# Patient Record
Sex: MALE | HISPANIC OR LATINO | Employment: STUDENT | URBAN - METROPOLITAN AREA
[De-identification: names, ages, dates, MRNs, and addresses within clinical notes are randomized per-mention and may not be internally consistent; named-entity substitution may affect disease eponyms.]

---
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
## 2022-03-27 ENCOUNTER — HEALTH MAINTENANCE LETTER (OUTPATIENT)
Age: 22
End: 2022-03-27

## 2022-05-19 ENCOUNTER — OFFICE VISIT (OUTPATIENT)
Dept: PEDIATRICS | Facility: CLINIC | Age: 22
End: 2022-05-19
Payer: COMMERCIAL

## 2022-05-19 VITALS
TEMPERATURE: 97.9 F | HEART RATE: 73 BPM | BODY MASS INDEX: 23.23 KG/M2 | SYSTOLIC BLOOD PRESSURE: 115 MMHG | WEIGHT: 131.1 LBS | DIASTOLIC BLOOD PRESSURE: 60 MMHG | HEIGHT: 63 IN

## 2022-05-19 DIAGNOSIS — K59.01 SLOW TRANSIT CONSTIPATION: ICD-10-CM

## 2022-05-19 DIAGNOSIS — K64.4 SKIN TAGS, ANUS OR RECTUM: Primary | ICD-10-CM

## 2022-05-19 PROCEDURE — 90620 MENB-4C VACCINE IM: CPT | Performed by: STUDENT IN AN ORGANIZED HEALTH CARE EDUCATION/TRAINING PROGRAM

## 2022-05-19 PROCEDURE — 90471 IMMUNIZATION ADMIN: CPT | Performed by: STUDENT IN AN ORGANIZED HEALTH CARE EDUCATION/TRAINING PROGRAM

## 2022-05-19 PROCEDURE — 99213 OFFICE O/P EST LOW 20 MIN: CPT | Mod: 25 | Performed by: STUDENT IN AN ORGANIZED HEALTH CARE EDUCATION/TRAINING PROGRAM

## 2022-05-19 RX ORDER — DESVENLAFAXINE 50 MG/1
50 TABLET, FILM COATED, EXTENDED RELEASE ORAL EVERY MORNING
COMMUNITY
End: 2024-06-11

## 2022-05-19 RX ORDER — DESVENLAFAXINE 25 MG/1
25 TABLET, EXTENDED RELEASE ORAL EVERY MORNING
COMMUNITY
End: 2024-06-11

## 2022-05-19 NOTE — PATIENT INSTRUCTIONS
I recommend increasing hydration to 60+ oz of water per day.   Start with 1 capful of miralax mixed into 8 oz of water and drink daily (drink within approx 10 minutes).  Over 1 week will help establish softer more regular stools.

## 2022-05-25 NOTE — PROGRESS NOTES
"  Assessment & Plan     Skin tags, anus or rectum    - Adult General Surg Referral    2 prominent skin tags at anus. No concern for genital warts. Patient is not sexually active, negative sexual history.   Refer to general surgery for management.     Slow transit constipation.   Recommended miralax 1 capful daily with 8 oz of water to help manage slow transit constipation. Increased hydration.                  No follow-ups on file.    Vira HARRISON MD  [facility]   Ike is a 22 year old who presents for the following health issues     HPI     Concerns because of anal issue- ? hemmoroids?  Thinks that he has had this for several years- maybe 3-4  Now they feel bigger- its bothering him, worried about cleaning his anus  Has never had sexual intercourse  Negative history of STI  Denies bleeding from anus at any time- not with BM either  Has had ongoing constipation for years. On antidepressants as well. States stools every 3 days. BMs are uncomfortable/ painful at times.    Psychiatry manages antidepression medication    Patient Active Problem List    Diagnosis Date Noted     Moderate major depression (H) 12/05/2019     Priority: Medium     Controlled substance agreement signed 3/2019 04/12/2019     Priority: Medium     Intermittent asthma, well controlled 05/04/2018     Priority: Medium     Anxiety      Priority: Medium     Created by Conversion  Replacement Utility updated for latest IMO load         ADHD, predominantly inattentive type 10/11/2017     Priority: Medium     Asthma      Priority: Medium     Created by Conversion         Allergic Rhinitis      Priority: Medium     Created by Conversion  Replacement Utility updated for latest IMO load             Review of Systems   Constitutional, HEENT, cardiovascular, pulmonary, gi and gu systems are negative, except as otherwise noted.      Objective    /60   Pulse 73   Temp 97.9  F (36.6  C)   Ht 5' 3.25\" " (1.607 m)   Wt 131 lb 1.6 oz (59.5 kg)   BMI 23.04 kg/m    Body mass index is 23.04 kg/m .  Physical Exam   GENERAL: healthy, alert and no distress  RESP: lungs clear to auscultation - no rales, rhonchi or wheezes  CV: regular rate and rhythm, normal S1 S2, no S3 or S4, no murmur, click or rub, no peripheral edema and peripheral pulses strong  ABDOMEN: soft, nontender, no hepatosplenomegaly, no masses and bowel sounds normal   (male): penis normal and anus with 2 moderate skin tags present.   PSYCH: mentation appears normal, affect normal

## 2022-05-26 ENCOUNTER — TRANSFERRED RECORDS (OUTPATIENT)
Dept: HEALTH INFORMATION MANAGEMENT | Facility: CLINIC | Age: 22
End: 2022-05-26
Payer: COMMERCIAL

## 2022-05-26 ASSESSMENT — PATIENT HEALTH QUESTIONNAIRE - PHQ9
5. POOR APPETITE OR OVEREATING: NOT AT ALL
SUM OF ALL RESPONSES TO PHQ QUESTIONS 1-9: 4

## 2022-05-26 ASSESSMENT — ANXIETY QUESTIONNAIRES
3. WORRYING TOO MUCH ABOUT DIFFERENT THINGS: NOT AT ALL
IF YOU CHECKED OFF ANY PROBLEMS ON THIS QUESTIONNAIRE, HOW DIFFICULT HAVE THESE PROBLEMS MADE IT FOR YOU TO DO YOUR WORK, TAKE CARE OF THINGS AT HOME, OR GET ALONG WITH OTHER PEOPLE: SOMEWHAT DIFFICULT
5. BEING SO RESTLESS THAT IT IS HARD TO SIT STILL: NOT AT ALL
GAD7 TOTAL SCORE: 2
1. FEELING NERVOUS, ANXIOUS, OR ON EDGE: NOT AT ALL
6. BECOMING EASILY ANNOYED OR IRRITABLE: MORE THAN HALF THE DAYS
7. FEELING AFRAID AS IF SOMETHING AWFUL MIGHT HAPPEN: NOT AT ALL
2. NOT BEING ABLE TO STOP OR CONTROL WORRYING: NOT AT ALL
GAD7 TOTAL SCORE: 2

## 2022-05-26 ASSESSMENT — ASTHMA QUESTIONNAIRES: ACT_TOTALSCORE: 25

## 2022-06-02 ENCOUNTER — TELEPHONE (OUTPATIENT)
Dept: SURGERY | Facility: CLINIC | Age: 22
End: 2022-06-02
Payer: COMMERCIAL

## 2022-06-02 NOTE — TELEPHONE ENCOUNTER
ANIKA and sent mychart for gen surg referral. Pt can schedule an appointment with Monet Bond NP, next available opening.   Referral for skin tags from Dr. Ríos.

## 2022-06-20 ENCOUNTER — NURSE TRIAGE (OUTPATIENT)
Dept: NURSING | Facility: CLINIC | Age: 22
End: 2022-06-20
Payer: COMMERCIAL

## 2022-06-20 NOTE — TELEPHONE ENCOUNTER
Saulo calls and says that he wants a surgical consult. RN then checked Epic and saw that pt. Needs an appointment with Monet Barber NP, with a referral from Dr. Ríos. RN was connected with Colon Rectal Surgery and spoke to Sarahy. RN told Aminata daniel pt. Needing the appointment and conferenced pt., with Aminata, for further assistance. COVID 19 Nurse Triage Plan/Patient Instructions    Please be aware that novel coronavirus (COVID-19) may be circulating in the community. If you develop symptoms such as fever, cough, or SOB or if you have concerns about the presence of another infection including coronavirus (COVID-19), please contact your health care provider or visit [email or web address].     Disposition/Instructions    Home care recommended. Follow home care protocol based instructions.    Thank you for taking steps to prevent the spread of this virus.  o Limit your contact with others.  o Wear a simple mask to cover your cough.  o Wash your hands well and often.    Resources    [facility]: About COVID-19: [email or web address]    CDC: What to Do If You're Sick: www.cdc.gov/coronavirus/2019-ncov/about/steps-when-sick.html    CDC: Ending Home Isolation: www.cdc.gov/coronavirus/2019-ncov/hcp/disposition-in-home-patients.html     CDC: Caring for Someone: www.cdc.gov/coronavirus/2019-ncov/if-you-are-sick/care-for-someone.html     [facility]: Interim Guidance for Hospital Discharge to Home: www.health.[facility].[place]/diseases/coronavirus/hcp/hospdischarge.pdf    [facility] clinical trials (COVID-19 research studies): clinicalaffairs.[facility]-clinical-trials     Below are the COVID-19 hotlines at the [facility] of Health ([facility]). Interpreters are available.   o For health questions: Call 304-655-4577 or 1-376.524.7934 (7 a.m. to 7 p.m.)  o For questions about schools and childcare: Call 410-064-2872 or 1-302.647.5464 (7 a.m. to 7 p.m.)                    Reason for Disposition    [1] Follow-up call to recent contact AND [2] information only call, no triage required    Additional Information    Negative: Lab result questions    Negative: [1] Caller is not with the child AND [2] is reporting urgent symptoms    Negative: Medication or pharmacy questions    Negative: Caller is rude or angry    Negative: Caller cannot be reached by phone    Negative: Caller has already spoken to PCP or another triager    Negative: RN needs further essential information from caller in order to complete triage    Negative: [1] Pre-operative urgent question about upcoming surgery or procedure AND [2] triager can't answer question    Negative: [1] Pre-operative non-urgent question about upcoming surgery or procedure AND [2] triager can't answer question    Negative: Requesting regular office appointment    Negative: Requesting referral to a specialist    Negative: [1] Caller requesting nonurgent health information AND [2] PCP's office is the best resource    Negative: Health Information question, no triage required and triager able to answer question    Negative:  Information question, no triage required and triager able to answer question    Negative: Behavior or development information question, no triage required and triager able to answer question    Negative: General information question, no triage required and triager able to answer question    Negative: Question about upcoming scheduled surgery, procedure, or test, no triage required and triager able to answer question    Negative: [1] Caller is not with the child AND [2] probable non-urgent symptoms AND [3] unable to complete triage  (NOTE: parent to call back with triage info)    Protocols used: INFORMATION ONLY CALL - NO TRIAGE-P-

## 2022-06-21 NOTE — TELEPHONE ENCOUNTER
Diagnosis, Referred by & from: Anal Skin Tags   Appt date: 9/20/2022   NOTES STATUS DETAILS   OFFICE NOTE from referring provider Internal [facility]:  5/19/22 - PED OV with Dr. Ríos   OFFICE NOTE from other specialist N/A    DISCHARGE SUMMARY from hospital N/A    DISCHARGE REPORT from the ER Internal [facility]:  4/23/18 - ED OV with Dr. Kelley   OPERATIVE REPORT N/A    MEDICATION LIST Internal    LABS N/A    DIAGNOSTIC PROCEDURES N/A    IMAGING (DISC & REPORT)      XRAY Internal [facility]:  4/23/18 - XR Pelvis

## 2022-09-20 ENCOUNTER — OFFICE VISIT (OUTPATIENT)
Dept: SURGERY | Facility: CLINIC | Age: 22
End: 2022-09-20
Attending: STUDENT IN AN ORGANIZED HEALTH CARE EDUCATION/TRAINING PROGRAM
Payer: COMMERCIAL

## 2022-09-20 ENCOUNTER — PRE VISIT (OUTPATIENT)
Dept: SURGERY | Facility: CLINIC | Age: 22
End: 2022-09-20

## 2022-09-20 VITALS
OXYGEN SATURATION: 97 % | HEIGHT: 63 IN | BODY MASS INDEX: 23.21 KG/M2 | DIASTOLIC BLOOD PRESSURE: 71 MMHG | WEIGHT: 131 LBS | SYSTOLIC BLOOD PRESSURE: 111 MMHG | HEART RATE: 76 BPM

## 2022-09-20 DIAGNOSIS — K64.4 SKIN TAGS, ANUS OR RECTUM: ICD-10-CM

## 2022-09-20 DIAGNOSIS — L29.0 PRURITUS ANI: Primary | ICD-10-CM

## 2022-09-20 PROCEDURE — 99203 OFFICE O/P NEW LOW 30 MIN: CPT | Performed by: NURSE PRACTITIONER

## 2022-09-20 ASSESSMENT — ENCOUNTER SYMPTOMS
NAIL CHANGES: 0
SKIN CHANGES: 0
POOR WOUND HEALING: 0

## 2022-09-20 ASSESSMENT — PAIN SCALES - GENERAL: PAINLEVEL: MODERATE PAIN (4)

## 2022-09-20 NOTE — LETTER
"2022       RE: Saulo Cleary  [address] [address] [address]     Dear Colleague,    Thank you for referring your patient, Saulo Cleary, to the [facility] [place] [facility]. Please see a copy of my visit note below.    Colon and Rectal Surgery Consult Clinic Note    Date: 2022     Referring provider:  Vira HARRISON MD  [address] [place] [address]     RE: Saulo Cleary  : 2000  ZOHRA: 2022    Saulo Cleary is a very pleasant 22 year old male who presents today for anal skin tag.    HPI:  Itching and irritation. No bleeding. Has difficulty keeping clean due to some skin tags that are left behind. Has to go back and wipe. History of constipation but is on Miralax now and stools are better. No anorectal surgeries in the past. Does not smoke. Does not have anal intercourse. No tissue that needs to be manually reduced.    Physical Examination: Exam was chaperoned by Gail Correa RN   /71 (BP Location: Left arm, Patient Position: Sitting, Cuff Size: Adult Regular)   Pulse 76   Ht 5' 3\"   Wt 131 lb   SpO2 97%   BMI 23.21 kg/m    General: alert, oriented, in no acute distress  Perianal external examination:  Perianal skin: Intact with no excoriation or lichenification.  Lesions: No evidence of an external lesion, nodularity, or induration in the perianal region.  Eversion of buttocks: There was not evidence of an anal fissure. Details: N/A.  Skin tags or external hemorrhoids: None.  Digital rectal examination: Was performed.   Sphincter tone: Good.  Palpable lesions: No.  Prostate: Not assessed.  Other: None.    Anoscopy: Was deferred    Assessment/Plan: 22 year old male with anal itching and anal skin tags.  I recommended starting a daily fiber supplement to bulk up his stools to help with the itching and irritation as this is likely due to moisture.  Also use a zinc " barrier cream on the perianal skin.  He is bothered by the anal skin tags and they are interfering with hygiene.  We discussed that these would need to be surgically removed.  Discussed risks of surgery including pain, bleeding, infection, stenosis, hemorrhoids, difficulty voiding after surgery, and the fact that we cannot promise a good cosmetic result and he could get skin tags from the surgery itself.  He states an understanding of these risks and wishes to proceed with surgical intervention.    Medical history:  Past Medical History:   Diagnosis Date     Allergic rhinitis, cause unspecified      Anxiety state, unspecified      Overweight(278.02)      Sialoadenitis      Unspecified asthma(493.90)        Surgical history:  No past surgical history on file.    Problem list:  Patient Active Problem List    Diagnosis Date Noted     Moderate major depression (H) 12/05/2019     Priority: Medium     Controlled substance agreement signed 3/2019 04/12/2019     Priority: Medium     Intermittent asthma, well controlled 05/04/2018     Priority: Medium     Anxiety      Priority: Medium     Created by Conversion  Replacement Utility updated for latest IMO load         ADHD, predominantly inattentive type 10/11/2017     Priority: Medium     Asthma      Priority: Medium     Created by Conversion         Allergic Rhinitis      Priority: Medium     Created by Conversion  Replacement Utility updated for latest IMO load           Medications:  Current Outpatient Medications   Medication Sig Dispense Refill     albuterol (VENTOLIN HFA) 90 mcg/actuation inhaler [ALBUTEROL (VENTOLIN HFA) 90 MCG/ACTUATION INHALER] INHALE TWO PUFFS BY MOUTH EVERY 4 HOURS AS NEEDED FOR WHEEZING OR COUGH 1 Inhaler 4     desvenlafaxine (PRISTIQ) 50 MG 24 hr tablet desvenlafaxine succinate ER 50 mg tablet,extended release 24 hr       desvenlafaxine succinate (PRISTIQ) 25 MG 24 hr tablet desvenlafaxine succinate ER 25 mg tablet,extended release 24 hr        "fluticasone (FLOVENT HFA) 44 mcg/actuation inhaler [FLUTICASONE (FLOVENT HFA) 44 MCG/ACTUATION INHALER] Inhale 2 puffs 2 (two) times a day. 1 Inhaler 4     cloNIDine HCL (CATAPRES) 0.1 MG tablet [CLONIDINE HCL (CATAPRES) 0.1 MG TABLET] Take 0.1 mg by mouth daily.         Allergies:  No Known Allergies    Family history:  No family history on file.    Social history:  Social History     Tobacco Use     Smoking status: Never Smoker     Smokeless tobacco: Never Used   Substance Use Topics     Alcohol use: Not on file    Marital status: single.    Nursing Notes:   Cruz Mera, EMT  9/20/2022  9:03 AM  Signed  Chief Complaint   Patient presents with     Skin Tags       Vitals:    09/20/22 0901   BP: 111/71   BP Location: Left arm   Patient Position: Sitting   Cuff Size: Adult Regular   Pulse: 76   SpO2: 97%   Weight: 131 lb   Height: 5' 3\"       Body mass index is 23.21 kg/m .    Cruz Mera EMT-P         28 minutes spent on the date of encounter (excluding time performing procedures) performing chart review, history and exam, documentation and further activities as noted above.    ROQUE Warren, NP-C  Colon and Rectal Surgery   [facility]    This note was created using speech recognition software and may contain unintended word substitutions.  "

## 2022-09-20 NOTE — PROGRESS NOTES
"Colon and Rectal Surgery Consult Clinic Note    Date: 2022     Referring provider:  Vira HARRISON MD  [address] [place] [address]     RE: Saulo Cleary  : 2000  ZOHRA: 2022    Saulo Cleary is a very pleasant 22 year old male who presents today for anal skin tags.    ***  Physical Examination: Exam was chaperoned by ***  /71 (BP Location: Left arm, Patient Position: Sitting, Cuff Size: Adult Regular)   Pulse 76   Ht 5' 3\"   Wt 131 lb   SpO2 97%   BMI 23.21 kg/m    General: ***  HEENT: ***  Abdomen: ***  Extremities: ***  Perianal external examination:  Perianal skin: {INTACT / ABNORMAL:484038::\"Intact with no excoriation or lichenification\"}.  Lesions: {YES/NO  Comments:688043::\"No evidence of an external lesion, nodularity, or induration in the perianal region\"}.  Eversion of buttocks: There {WAS/WAS NOT (default):546262::\"was not\"} evidence of an anal fissure. Details: {Not Applicable or free text:614745::\"N/A\"}.  Skin tags or external hemorrhoids: {YES/NO  Comments:258681::\"None\"}.  Digital rectal examination: {Was performed/was not performed/etc:888804512}    Anoscopy: {Was performed/was not performed/etc:249573928}    Procedures:  ***    Assessment/Plan: 22 year old male { :483311} with a recent diagnosis of  ***.   ***    Medical history:  Past Medical History:   Diagnosis Date     Allergic rhinitis, cause unspecified      Anxiety state, unspecified      Overweight(278.02)      Sialoadenitis      Unspecified asthma(493.90)        Surgical history:  No past surgical history on file.    Problem list:  Patient Active Problem List    Diagnosis Date Noted     Moderate major depression (H) 2019     Priority: Medium     Controlled substance agreement signed 3/2019 2019     Priority: Medium     Intermittent asthma, well controlled 2018     Priority: Medium     Anxiety      Priority: Medium     Created by Conversion  Replacement Utility updated for latest " "IMO load         ADHD, predominantly inattentive type 10/11/2017     Priority: Medium     Asthma      Priority: Medium     Created by Conversion         Allergic Rhinitis      Priority: Medium     Created by Conversion  Replacement Utility updated for latest IMO load           Medications:  Current Outpatient Medications   Medication Sig Dispense Refill     albuterol (VENTOLIN HFA) 90 mcg/actuation inhaler [ALBUTEROL (VENTOLIN HFA) 90 MCG/ACTUATION INHALER] INHALE TWO PUFFS BY MOUTH EVERY 4 HOURS AS NEEDED FOR WHEEZING OR COUGH 1 Inhaler 4     desvenlafaxine (PRISTIQ) 50 MG 24 hr tablet desvenlafaxine succinate ER 50 mg tablet,extended release 24 hr       desvenlafaxine succinate (PRISTIQ) 25 MG 24 hr tablet desvenlafaxine succinate ER 25 mg tablet,extended release 24 hr       fluticasone (FLOVENT HFA) 44 mcg/actuation inhaler [FLUTICASONE (FLOVENT HFA) 44 MCG/ACTUATION INHALER] Inhale 2 puffs 2 (two) times a day. 1 Inhaler 4     cloNIDine HCL (CATAPRES) 0.1 MG tablet [CLONIDINE HCL (CATAPRES) 0.1 MG TABLET] Take 0.1 mg by mouth daily.         Allergies:  No Known Allergies    Family history:  No family history on file.    Social history:  Social History     Tobacco Use     Smoking status: Never Smoker     Smokeless tobacco: Never Used   Substance Use Topics     Alcohol use: Not on file    Marital status: single.  Occupation: ***.    Nursing Notes:   Cruz Mera, EMT  9/20/2022  9:03 AM  Signed  Chief Complaint   Patient presents with     Skin Tags       Vitals:    09/20/22 0901   BP: 111/71   BP Location: Left arm   Patient Position: Sitting   Cuff Size: Adult Regular   Pulse: 76   SpO2: 97%   Weight: 131 lb   Height: 5' 3\"       Body mass index is 23.21 kg/m .    Cruz Mera EMT-P         *** minutes spent on the date of encounter (excluding time performing procedures) performing chart review, history and exam, documentation and further activities as noted above with an additional *** for ***.     Monet diallo" ROQUE Kendrick, NP-C  Colon and Rectal Surgery   [facility]    This note was created using speech recognition software and may contain unintended word substitutions.

## 2022-09-20 NOTE — PATIENT INSTRUCTIONS
Start a daily fiber supplement such as Citrucel or Metamucil. Start with once a day and slowly increase up to three times a day, if needed, over the next 4-6 weeks  Calmoseptine cream to perianal skin as needed  Will get you scheduled for examination under anesthesia with skin tag removal

## 2022-09-20 NOTE — NURSING NOTE
"Chief Complaint   Patient presents with     Skin Tags       Vitals:    09/20/22 0901   BP: 111/71   BP Location: Left arm   Patient Position: Sitting   Cuff Size: Adult Regular   Pulse: 76   SpO2: 97%   Weight: 131 lb   Height: 5' 3\"       Body mass index is 23.21 kg/m .    Cruz Mera EMT-P    "

## 2022-09-24 ENCOUNTER — HEALTH MAINTENANCE LETTER (OUTPATIENT)
Age: 22
End: 2022-09-24

## 2022-09-30 ENCOUNTER — OFFICE VISIT (OUTPATIENT)
Dept: FAMILY MEDICINE | Facility: CLINIC | Age: 22
End: 2022-09-30
Payer: COMMERCIAL

## 2022-09-30 VITALS
TEMPERATURE: 98.5 F | BODY MASS INDEX: 22.62 KG/M2 | WEIGHT: 127.7 LBS | SYSTOLIC BLOOD PRESSURE: 110 MMHG | DIASTOLIC BLOOD PRESSURE: 72 MMHG

## 2022-09-30 DIAGNOSIS — L08.89 SECONDARY INFECTION OF SKIN: ICD-10-CM

## 2022-09-30 DIAGNOSIS — L98.9 PSORIASIS-LIKE SKIN DISEASE: Primary | ICD-10-CM

## 2022-09-30 PROCEDURE — 99215 OFFICE O/P EST HI 40 MIN: CPT | Performed by: FAMILY MEDICINE

## 2022-09-30 RX ORDER — BETAMETHASONE DIPROPIONATE 0.5 MG/G
OINTMENT, AUGMENTED TOPICAL 2 TIMES DAILY
Qty: 50 G | Refills: 0 | Status: SHIPPED | OUTPATIENT
Start: 2022-09-30 | End: 2024-06-11

## 2022-09-30 RX ORDER — CALCIPOTRIENE 50 UG/G
OINTMENT TOPICAL
Qty: 120 G | Refills: 0 | Status: SHIPPED | OUTPATIENT
Start: 2022-09-30 | End: 2023-09-07

## 2022-09-30 RX ORDER — DOXYCYCLINE 100 MG/1
100 CAPSULE ORAL 2 TIMES DAILY
Qty: 20 CAPSULE | Refills: 0 | Status: SHIPPED | OUTPATIENT
Start: 2022-09-30 | End: 2022-10-10

## 2022-09-30 RX ORDER — MUPIROCIN 20 MG/G
OINTMENT TOPICAL 2 TIMES DAILY
Qty: 22 G | Refills: 0 | Status: SHIPPED | OUTPATIENT
Start: 2022-09-30 | End: 2022-12-16

## 2022-09-30 NOTE — PROGRESS NOTES
ASSESSMENT/PLAN:      ICD-10-CM    1. Psoriasis-like skin disease  L98.9 calcipotriene (DOVONOX) 0.005 % external ointment     augmented betamethasone dipropionate (DIPROLENE-AF) 0.05 % external ointment   2. Secondary infection of skin  L08.89 mupirocin (BACTROBAN) 2 % external ointment     doxycycline monohydrate (MONODOX) 100 MG capsule   On the day of the encounter, time spend on chart review, patient visit, review of testing, extensive review of options for treatment of rash that has failed conservative treatment and documentation was 45  minutes      Patient Instructions   Start doxycycline 2 times a day for 10 days always take with food to prevent nausea vomiting even the pharmacist tells you not to take it with food for infected rash on right buttock    Start Bactroban(mupirocin) ointment twice a day to infected area on right buttock    Once redness crusting has resolved and if there is a flaky silver scaled patch remaining start the calcipotriene to the area 2 times a day followed by the betamethasone 2 times a day can take up to 1 week to 3 weeks to clear and for maintenance use the betamethasone on area the weekends once a day at bedtime    For area on left buttock start the calcipotriene to the area 2 times a day followed by the betamethasone 2 times a day can take up to 1 week to 3 weeks to clear and for maintenance use the betamethasone on area the weekends once a day at bedtime    For area on left knee but a very small amount of the calcific triad and followed by the betamethasone on the area at bedtime    Use only unscented washes soaps and detergent to prevent further irritation to the areas    Please schedule an appointment with your dermatologist for long-term plan-you may be a candidate for oral medications that could resolve these rashes so the rash  do not continue to come back-must be followed by dermatologist          Subjective   Ike is a 22 year old, presenting for the following health  issues:  Derm Problem (Rash around buttocks, has two spots on and off for years,  flare up the last month.  Went to  [facility] and was prescribe Fluocinolone cream that isn't working, complains of itching.  )      HPI     Rash  Onset/Duration: 2 spots on buttocks-intermittent flares since age 14-last flare last month-[facility] fluocinolone cream-2 times a day for 2 weeks, improved, but did not resolve, flaky at first and now worse-red pruritic and crusting-same 2 spots and new spot soft tissue of r medial knee   Hx of eczema nearly resolved once in teen years   Prior to steroid cream [facility], used fungal wash for 3 months and no better   Description  Location: bilateral buttock and medial right knee   Character: raised, flakey, red  Itching: moderate-knee, buttocks-severe   Intensity:  moderate  Progression of Symptoms:  worsening  Accompanying signs and symptoms:   Fever: No  Body aches or joint pain: No  Sore throat symptoms: No  Recent cold symptoms: No  History:           Previous episodes of similar rash: None  New exposures:  None  Recent travel: No  Exposure to similar rash: No  Precipitating or alleviating factors: unknown  What triggers rash   Therapies tried and outcome: moisturizing cream has helped in past, most recent flare made it worse and one flare a month ago,  Current steroid cream worked better, but area did not resolve     Review of Systems   Constitutional, HEENT, cardiovascular, pulmonary, GI, , musculoskeletal, neuro, skin, endocrine and psych systems are negative, except as otherwise noted.      Objective    /72 (BP Location: Left arm, Patient Position: Sitting, Cuff Size: Adult Small)   Temp 98.5  F (36.9  C) (Oral)   Wt 57.9 kg (127 lb 11.2 oz)   BMI 22.62 kg/m    Body mass index is 22.62 kg/m .  Physical Exam   GENERAL:  alert and no distress  MS: no gross musculoskeletal defects noted, no edema  SKIN: buttocks -right lower buttock-large patch of  erythematous excoriated tissue with mild induration scattered crusting over the lower portion of the buttock into the folds of the lower buttock with the upper thigh  Left lower buttock large patch of silvery scale with mild excoriation and flaking minimal erythema slight pink in color  Right upper posterior thigh several small scattered patches of dry silvery scale varying in diameter from 1 cm to 3cm. left upper posterior thigh-2-3 similar lesions as noted on right   CMA in room with this provider during entire exam of buttocks and upper thigh and provider and CMA left the room together just prior to patient getting dressed after exam   NEURO: Normal strength and tone, mentation intact and speech normal, normal gait  PSYCH: mentation appears normal, affect normal      Diagnostic Test Results:  Labs reviewed in Epic  No results found for any visits on 09/30/22.

## 2022-09-30 NOTE — PATIENT INSTRUCTIONS
Start doxycycline 2 times a day for 10 days always take with food to prevent nausea vomiting even the pharmacist tells you not to take it with food for infected rash on right buttock    Start Bactroban(mupirocin) ointment twice a day to infected area on right buttock    Once redness crusting has resolved and if there is a flaky silver scaled patch remaining start the calcipotriene to the area 2 times a day followed by the betamethasone 2 times a day can take up to 1 week to 3 weeks to clear and for maintenance use the betamethasone on area the weekends once a day at bedtime    For area on left buttock start the calcipotriene to the area 2 times a day followed by the betamethasone 2 times a day can take up to 1 week to 3 weeks to clear and for maintenance use the betamethasone on area the weekends once a day at bedtime    For area on left knee but a very small amount of the calcific triad and followed by the betamethasone on the area at bedtime    Use only unscented washes soaps and detergent to prevent further irritation to the areas    Please schedule an appointment with your dermatologist for long-term plan-you may be a candidate for oral medications that could resolve these rashes so the rash  do not continue to come back-must be followed by dermatologist

## 2022-10-10 ENCOUNTER — TRANSFERRED RECORDS (OUTPATIENT)
Dept: HEALTH INFORMATION MANAGEMENT | Facility: CLINIC | Age: 22
End: 2022-10-10

## 2022-11-07 DIAGNOSIS — K64.4 SKIN TAGS, ANUS OR RECTUM: Primary | ICD-10-CM

## 2022-11-14 ENCOUNTER — TELEPHONE (OUTPATIENT)
Dept: SURGERY | Facility: CLINIC | Age: 22
End: 2022-11-14

## 2022-11-14 NOTE — TELEPHONE ENCOUNTER
Per Case Request, patient's outpatient surgery can be with any C&R surgeon.    Spoke with patient, completed the following scheduling, will send Surgery Packet via [employer]:    Surgery: Exam Under Anesthesia, Anal Skin Tag Removal    Patient is scheduled for surgery with Dr. Jose Roberto Escobar    Spoke with: Ike's mother Amelia (his [employer] messages also go to her)    Date of Surgery: 12/30/2022    [facility]: [facility]    Informed patient they will need an adult  Yes    Pre op with Provider Monet Bond NP    H&P: Scheduled with PAC on 12/16/2022    Post-op appt scheduled with Rachel Warren PA-C, on 1/16/2023 at 4:00 PM    Surgery packet: will send via [employer]NOW     Additional comments: patient is Fully Vaccinated: [employer], will bring Vaccination Record Card.    Stacey Jackson  Daniella-op Coordinator  [place]-Rectal Surgery  Direct Phone: [phone]

## 2022-11-15 ENCOUNTER — TRANSFERRED RECORDS (OUTPATIENT)
Dept: HEALTH INFORMATION MANAGEMENT | Facility: CLINIC | Age: 22
End: 2022-11-15

## 2022-11-15 NOTE — TELEPHONE ENCOUNTER
FUTURE VISIT INFORMATION      SURGERY INFORMATION:    Date: 12/30/22    [facility]    Surgeon:  Jose Roberto Escobar MD    Anesthesia Type:  MAC    Procedure: EXAM UNDER ANESTHESIA, ANUS, anal skin tag removal REMOVAL, SKIN TAG, ANUS    RECORDS REQUESTED FROM:       Primary Care Provider: Vira Ríos MD

## 2022-12-16 ENCOUNTER — OFFICE VISIT (OUTPATIENT)
Dept: SURGERY | Facility: CLINIC | Age: 22
End: 2022-12-16
Payer: COMMERCIAL

## 2022-12-16 ENCOUNTER — ANESTHESIA EVENT (OUTPATIENT)
Dept: SURGERY | Facility: CLINIC | Age: 22
End: 2022-12-16

## 2022-12-16 ENCOUNTER — PRE VISIT (OUTPATIENT)
Dept: SURGERY | Facility: CLINIC | Age: 22
End: 2022-12-16

## 2022-12-16 VITALS
BODY MASS INDEX: 23.74 KG/M2 | OXYGEN SATURATION: 93 % | TEMPERATURE: 97.9 F | SYSTOLIC BLOOD PRESSURE: 115 MMHG | HEIGHT: 63 IN | HEART RATE: 74 BPM | RESPIRATION RATE: 16 BRPM | DIASTOLIC BLOOD PRESSURE: 73 MMHG | WEIGHT: 134 LBS

## 2022-12-16 DIAGNOSIS — Z01.818 PREOP EXAMINATION: Primary | ICD-10-CM

## 2022-12-16 DIAGNOSIS — K64.4 SKIN TAGS, ANUS OR RECTUM: ICD-10-CM

## 2022-12-16 PROCEDURE — 99203 OFFICE O/P NEW LOW 30 MIN: CPT | Performed by: NURSE PRACTITIONER

## 2022-12-16 ASSESSMENT — PAIN SCALES - GENERAL: PAINLEVEL: NO PAIN (0)

## 2022-12-16 ASSESSMENT — ENCOUNTER SYMPTOMS: ORTHOPNEA: 0

## 2022-12-16 NOTE — H&P
Pre-Operative H & P     CC:  Preoperative exam to assess for increased cardiopulmonary risk while undergoing surgery and anesthesia.    Date of Encounter: 12/16/2022  Primary Care Physician:  Vira Ríos     Reason for visit:   Encounter Diagnoses   Name Primary?     Preop examination Yes     Skin tags, anus or rectum        HPI  Saulo Cleary is a 22 year old male who presents for pre-operative H & P in preparation for  Procedure Information     Date/Time: 12/30/22, time unknown     Procedure:   EXAM UNDER ANESTHESIA, ANUS, anal skin tag removal N/A MAC   REMOVAL, SKIN TAG, ANUS           Anesthesia type: MAC    Pre-op diagnosis: anal skin tags    Location: [facility] [place]    Providers: Dr. Escobar          Saulo Cleary is a 22 year old male with asthma, anxiety, depression, ADHD and insomnia that has anal skin tags.  He feels these are interfering with hygiene. They also cause itching and irritation.  He has consulted with with ROQUE Kraus in the colorectal clinic and the above listed procedure has been recommended for treatment.     History is obtained from the patient and chart review    Hx of abnormal bleeding or anti-platelet use: none      Past Medical History  Past Medical History:   Diagnosis Date     ADHD (attention deficit hyperactivity disorder)      Allergic rhinitis, cause unspecified      Anxiety state, unspecified      Depression      Insomnia      Overweight(278.02)      Sialoadenitis      Unspecified asthma(493.90)        Past Surgical History  Past Surgical History:   Procedure Laterality Date     wisdom teeth         Prior to Admission Medications  Current Outpatient Medications   Medication Sig Dispense Refill     albuterol (VENTOLIN HFA) 90 mcg/actuation inhaler [ALBUTEROL (VENTOLIN HFA) 90 MCG/ACTUATION INHALER] INHALE TWO PUFFS BY MOUTH EVERY 4 HOURS AS NEEDED FOR WHEEZING OR COUGH 1 Inhaler 4     augmented betamethasone dipropionate (DIPROLENE-AF)  0.05 % external ointment Apply topically 2 times daily 50 g 0     calcipotriene (DOVONOX) 0.005 % external ointment Apply to area of rash 2 time a day 120 g 0     cloNIDine HCL (CATAPRES) 0.1 MG tablet Take 0.1 mg by mouth as needed       desvenlafaxine (PRISTIQ) 50 MG 24 hr tablet Take 50 mg by mouth every morning Take with 25 mg tablet for total dose of 75 mg       desvenlafaxine succinate (PRISTIQ) 25 MG 24 hr tablet Take 25 mg by mouth every morning Take with 50 mg tablet for total dose of 75 mg       fluticasone (FLOVENT HFA) 44 mcg/actuation inhaler [FLUTICASONE (FLOVENT HFA) 44 MCG/ACTUATION INHALER] Inhale 2 puffs 2 (two) times a day. (Patient taking differently: Inhale 2 puffs into the lungs as needed) 1 Inhaler 4       Allergies  No Known Allergies    Social History  Social History     Socioeconomic History     Marital status: Single     Spouse name: Not on file     Number of children: 0     Years of education: Not on file     Highest education level: Not on file   Occupational History     Occupation: student   Tobacco Use     Smoking status: Never     Smokeless tobacco: Never   Substance and Sexual Activity     Alcohol use: Never     Drug use: Never     Sexual activity: Not on file   Other Topics Concern     Not on file   Social History Narrative    Parents are .  Ike and sister Monet stay at both parents' homes.     Social Determinants of Health     Financial Resource Strain: Not on file   Food Insecurity: Not on file   Transportation Needs: Not on file   Physical Activity: Not on file   Stress: Not on file   Social Connections: Not on file   Intimate Partner Violence: Not on file   Housing Stability: Not on file       Family History  Family History   Adopted: Yes   Problem Relation Age of Onset     Unknown/Adopted Mother      Unknown/Adopted Father      Anesthesia Reaction No family hx of      Thrombosis No family hx of        Review of Systems  The complete review of systems is negative  "other than noted in the HPI or here.   Anesthesia Evaluation   Pt has had prior anesthetic.     No history of anesthetic complications       ROS/MED HX  ENT/Pulmonary:     (+) Intermittent, asthma Last exacerbation: 9 years ago ,  Treatment: Inhaler prn,   (-) MAYO risk factors and recent URI   Neurologic:  - neg neurologic ROS     Cardiovascular:  - neg cardiovascular ROS   (+) -----No previous cardiac testing  (-) MACK and orthopnea/PND   METS/Exercise Tolerance: >4 METS Comment: Does weight lifting for exercise.  Is able to walk several blocks.     Hematologic:  - neg hematologic  ROS  (-) history of blood clots and history of blood transfusion   Musculoskeletal:  - neg musculoskeletal ROS     GI/Hepatic: Comment: Anal skin tags      Renal/Genitourinary:  - neg Renal ROS     Endo:  - neg endo ROS     Psychiatric/Substance Use:     (+) psychiatric history anxiety, depression and other (comment) (ADHD, insomnia)     Infectious Disease:  - neg infectious disease ROS  (-) Recent Fever   Malignancy:  - neg malignancy ROS     Other:  - neg other ROS          /73 (BP Location: Right arm, Patient Position: Sitting, Cuff Size: Adult Regular)   Pulse 74   Temp 97.9  F (36.6  C) (Oral)   Resp 16   Ht 1.6 m (5' 3\")   Wt 60.8 kg (134 lb)   SpO2 93%   BMI 23.74 kg/m      Physical Exam   Constitutional: Awake, alert, cooperative, no apparent distress, and appears stated age.  Eyes: Pupils equal, round and reactive to light, extra ocular muscles intact, sclera clear, conjunctiva normal.  HENT: Normocephalic, oral pharynx with moist mucus membranes, good dentition. No goiter appreciated.   Respiratory: Clear to auscultation bilaterally, no crackles or wheezing.  Cardiovascular: Regular rate and rhythm, normal S1 and S2, and no murmur noted.  Carotids +2, no bruits. No edema. Palpable pulses to radial  DP and PT arteries.   GI: Normal bowel sounds, soft, non-distended, non-tender, no masses palpated, no " hepatosplenomegaly.   Lymph/Hematologic: No cervical lymphadenopathy and no supraclavicular lymphadenopathy.  Genitourinary:  deferred  Skin: Warm and dry.    Musculoskeletal: Full ROM of neck. There is no redness, warmth, or swelling of the exposed joints. Gross motor strength is normal.    Neurologic: Awake, alert, oriented to name, place and time. Cranial nerves II-XII are grossly intact. Gait is normal.   Neuropsychiatric: Calm, cooperative. Normal affect.     Prior Labs/Diagnostic Studies   All labs and imaging personally reviewed     EKG/ stress test - none    Component      Latest Ref Rng & Units 10/1/2020   WBC      4.0 - 11.0 thou/uL 4.8   RBC Count      4.40 - 6.20 mill/uL 4.76   Hemoglobin      14.0 - 18.0 g/dL 15.7   Hematocrit      40.0 - 54.0 % 46.3   MCV      80 - 100 fL 97   MCH      27.0 - 34.0 pg 33.1   MCHC      32.0 - 36.0 g/dL 34.0   RDW      11.0 - 14.5 % 10.1 (L)   Platelet Count      140 - 440 thou/uL 255   Mean Platelet Volume      7.0 - 10.0 fL 8.3   % Neutrophils      50 - 70 % 53   % Lymphocytes      20 - 40 % 34   % Monocytes      2 - 10 % 7   % Eosinophils      0 - 6 % 5   % Basophils      0 - 2 % 1   Absolute Neutrophils      2.0 - 7.7 thou/uL 2.5   Absolute Lymphocytes      0.8 - 4.4 thou/uL 1.6   Absolute Monocytes      0.0 - 0.9 thou/uL 0.3   Eosinophils Absolute      0.0 - 0.4 thou/uL 0.2   Absolute Basophils      0.0 - 0.2 thou/uL 0.0   Sodium      136 - 145 mmol/L 140   Potassium      3.5 - 5.0 mmol/L 4.5   Chloride      98 - 107 mmol/L 104   Carbon Dioxide      22 - 31 mmol/L 25   Anion Gap      5 - 18 mmol/L 11   Glucose      70 - 125 mg/dL 72   Urea Nitrogen      8 - 22 mg/dL 10   Creatinine      0.70 - 1.30 mg/dL 0.78   GFR Estimate If Black      >60 mL/min/1.73m2 >60   GFR Estimate      >60 mL/min/1.73m2 >60   Bilirubin Total      0.0 - 1.0 mg/dL 0.3   Calcium      8.5 - 10.5 mg/dL 9.6   Protein Total      6.0 - 8.0 g/dL 7.4   Albumin      3.5 - 5.0 g/dL 4.2   Alkaline  "Phosphatase      50 - 364 U/L 91   AST      0 - 40 U/L 17   ALT      0 - 45 U/L <9       The patient's records and results personally reviewed by this provider.     Outside records reviewed from: Care Everywhere        Assessment      Saulo Cleary is a 22 year old male seen as a PAC referral for risk assessment and optimization for anesthesia.    Plan/Recommendations  Pt will be optimized for the proposed procedure.  See below for details on the assessment, risk, and preoperative recommendations    NEUROLOGY  - No history of TIA, CVA or seizure    -Post Op delirium risk factors:  No risk identified    ENT  - No current airway concerns.  Will need to be reassessed day of surgery.  Mallampati: I  TM: > 3    CARDIAC  - no noted cardiac history  - METS (Metabolic Equivalents) = >4  Patient performs 4 or more METS exercise without symptoms            Total Score: 0      RCRI-Very low risk: Class 1 0.4% complication rate            Total Score: 0        PULMONARY    MAYO Low Risk            Total Score: 1    MAYO: Male      - Asthma  Well controlled  - Tobacco History      History   Smoking Status     Never   Smokeless Tobacco     Never       GI  - denies GERD  PONV Medium Risk  Total Score: 2           1 AN PONV: Patient is not a current smoker    1 AN PONV: Intended Post Op Opioids            ENDOCRINE    - BMI: Estimated body mass index is 23.74 kg/m  as calculated from the following:    Height as of this encounter: 1.6 m (5' 3\").    Weight as of this encounter: 60.8 kg (134 lb).  Healthy Weight (BMI 18.5-24.9)  - No history of Diabetes Mellitus    HEME  VTE Low Risk 0.5%            Total Score: 2    VTE: Male      - No history of abnormal bleeding or antiplatelet use.          PSYCH  - continue pristiq with no interruption prior to surgery         Different anesthesia methods/types have been discussed with the patient, but they are aware that the final plan will be decided by the assigned anesthesia provider on the " date of service.      The patient is optimized for their procedure. AVS with information on surgery time/arrival time, meds and NPO status given by nursing staff. No further diagnostic testing indicated.      On the day of service:     Prep time: 6 minutes  Visit time: 15 minutes  Documentation time: 11 minutes  ------------------------------------------  Total time: 32 minutes      ROQUE Howard CNP  Preoperative Assessment Center  [facility]  Clinic and Surgery Center  Phone: [phone]  Fax: [phone]

## 2022-12-23 NOTE — TELEPHONE ENCOUNTER
Per Case Request, patient's outpatient surgery can be with any C&R surgeon.     Spoke with patient's mother Amelia, explained that Dr. Escobar got called away to do urgent surgeries at the hospital on 12/30/2022, so unfortunately we will have to reschedule patient's surgery. I explained that patient's surgery can be with any of the C&R surgeons, so the next available would be 1/3/3023 with Dr. Jevon Velarde at the [facility] OR. Amelia said that date would work well, we completed the following scheduling, will send Surgery Packet via [employer]:     Surgery: Exam Under Anesthesia, Anal Skin Tag Removal     Patient is scheduled for surgery with Dr. Jevon Velarde    Spoke with: Ike's mother Amelia (his [employer] messages also go to her)     Date of Surgery: 1/3/2023     Location: [facility] OR     Informed patient they will need an adult  Yes     Pre op with Provider Monet Bond NP     H&P: Completed with PAC on 12/16/2022     Post-op appt scheduled with Rachel Warren PA-C, on 1/16/2023 at 4:00 PM     Surgery packet: will send via [employer]      Additional comments: patient is Fully Vaccinated: [employer], will bring Vaccination Record Card (this info was for the [facility], it is not needed for the [facility] OR)     Stacey Jackson  Daniella-op Coordinator  [place]-Rectal Surgery  Direct Phone: [phone]

## 2022-12-29 NOTE — DISCHARGE INSTRUCTIONS
Anorectal Surgery Instructions    What can I expect after anorectal surgery?  Most anorectal procedures are done as outpatient surgery, and you go home the same day as the procedure. A few surgical procedures will require that you stay in the hospital for about one to three days. No matter where the procedure is done or how long or short it takes, these recommendations will help you heal and feel more comfortable.    Medicines:  The anal area is very sensitive; you can expect to have some pain for up to 2-4 weeks after the procedure. Your doctor will give you a prescription for one or more pain medications.    Take ibuprofen 600 mg four times a day OR naprosyn 500 mg twice a day  Take this on a regular basis (not as needed) following your surgery.   The drugs are best taken with food.  Do not take if it causes stomach upset or if you have a history of ulcers or gastritis. You can stop the naprosyn (or ibuprofen) or reduce the dose when you are feeling better.    Take acetominaphen (Tylenol) 650-1000 mg four times a day.   Take this on a regular basis (not as needed) following surgery for pain control.   Take the lower dose if you are >65 years old or have liver disease. The maximum dose of acetominaphen is 4000 mg a day. You can stop the acetaminophen or reduce the dose when you are feeling better.  It is important to realize that many narcotic pain relievers (including vicodin, percocet, tylenol #3) also have acetaminophen, and excessive doses of acetaminophen can be dangerous, so do not take these in addition to acetominaphen.  You may take narcotics that don't contain acetominaphen such as oxycodone.      Take oxycodone AS NEEDED in addition to the acetominaphen and naprosyn.    Because narcotics have side effects (including constipation), you should reduce your use of these medications as tolerated as your pain improves.    *In general, the best strategy is to take (if you are able to tolerate it) the tylenol  and ibuprofen on a regular basis until your pain has largely gone away. You can take the narcotic pain medicine as needed in addition to the tylenol and ibuprofen. As your pain begins to lessen, you should cut back on your narcotic use while continuing to take your regular tylenol and ibuprofen doses.    Refilling prescriptions. If you need additional pain medication, please call the triage nurse at [phone] during normal business hours (8 a.m. to 4 p.m., Monday though Friday) or have your pharmacy fax a refill request to [phone]. If you call after hours or on the weekends, the doctor on call may not know you personally and may not renew narcotic pain medication by phone. Call your primary care provider for all other medication refills.    Perineal care:  Tub baths:  If possible, take a tub bath or shower immediately after each bowel movement.   Baths should be take at least 3 times daily for the first week to 10 days following your procedure. You should soak in the tub for 10 to 15 minutes each time with water as warm as you can tolerate. You may also use a microphone shower head (with an extension) to shower your back-side instead of a bath.  Even after you go back to work, it is a good idea to sit in the tub in the morning, after returning from work, and again in the evening before bedtime.    Bleeding/Infection:  You can expect to have some bleeding after bowel movements, but it should stop soon after you wipe.   Use a wet cloth or perianal pad (Tucks or Preparation H pads) to gently wipe the area after each bowel movement.  Do not rub the anal area or use a lot of pressure.  Using a spray bottle filled with warm water helps loosen any remaining stool. Blot gently with a soft dry cloth or tissue paper.  Infection around the anal opening is not very common. The anal area has excellent blood supply, which helps the area to heal. Bloody discharge after bowel movements is normal and may last 2 to 4 weeks  after your surgery. However, if you bleed between bowel movements and cannot get it to stop, call the triage nurse immediately [phone].    Bowel function:  Take a fiber supplement such as Metamucil, which is over the counter. It is important to drink six to eight glasses of water or juice everyday when using fiber products.    If you do not have a bowel movement after 1-2 days:  Take Milk of Magnesia-2 tablespoons.     If there are no results, repeat this or add over the counter Miralax.    If you still do not have results, contact the clinic.   If there are no results, repeat this. Stop taking Milk of Magnesia or other laxatives if you begin to have diarrhea.    * Constipation will cause you to strain when you have a bowel movement. The hard stool will be difficult to pass, will increase pain and bleeding, and will slow down healing. Try to avoid constipation and/or diarrhea as this can make the pain and bleeding worse.    * It is important to have regular bowel movements at least every other day and to keep your stool soft. A high fiber diet, including at least four servings of fruits or vegetables daily, will help to keep your bowel movements regular and soft.    Activity:  After your procedure, there are no restrictions on your activity   Except restrictions because being on narcotics and in pain, such as no heavy machine operating or driving.   You may walk, climb stairs, ride in a car, and sit as tolerated.   It is helpful to avoid sitting in one position for long periods (2 or more hours).  After some surgeries, you may be told not to perform any lifting (more than 10 pounds) for several weeks after surgery.    When to call:  When do I need to call the doctor or triage nurse?  If you experience any of the problems listed here, call our triage nurse during business hours ([phone]).   The nurse will help you with your problem or have the doctor call you.   After hours and on weekends, please call the  [facility] number ([phone]) and ask for the colon and rectal surgery person on call.   Call for:   ? Fever greater than 101 degrees   ? Chills   ? Foul-smelling drainage   ? Nausea and vomiting   ? Diarrhea - greater than 4 water stools in 24 hours   ? Constipation - no bowel movement after 3 days   ? Severe bleeding that does not stop soon after a bowel movement   ? Problems with the incision, including severe pain, swelling, or redness  [facility]  Home Care Following Anesthesia  For 24 hours after surgery:  Get plenty of rest.  A responsible adult must stay with you for at least 24 hours after you leave the surgery center.  Do not drive or use heavy equipment.  If you have weakness or tingling, don't drive or use heavy equipment until this feeling goes away.   Do not drink alcohol.   Avoid strenuous or risky activities.  Ask for help when climbing stairs.  You may feel lightheaded.  IF so, sit for a few minutes before standing.  Have someone help you get up.   If you have nausea (feel sick to your stomach): Drink only clear liquids such as apple juice, ginger ale, broth or 7-Up.  Rest may also help.  Be sure to drink enough fluids.  Move to a regular diet as you feel able.   You may have a slight fever.  Call the doctor if your fever is over 100 F (37.7 C) (taken under the tongue) or lasts longer than 24 hours.  You may have a dry mouth, a sore throat, muscle aches or trouble sleeping. These should go away after 24 hours.  Do not make important or legal decisions.   It is recommended to avoid smoking.               Tips for taking pain medications  To get the best pain relief possible, remember these points:  Take pain medications as directed, before pain becomes severe.  Pain medication can upset your stomach: taking it with food may help.  Constipation is a common side effect of pain medication. Drink plenty of  fluids.  Eat foods high in fiber. Take a stool  softener if recommended by your doctor or pharmacist.  Do not drink alcohol, drive or operate machinery while taking pain medications.  Ask about other ways to control pain, such as with heat, ice or relaxation.    Tylenol/Acetaminophen Consumption  To help encourage the safe use of acetaminophen, the makers of TYLENOL  have lowered the maximum daily dose for single-ingredient Extra Strength TYLENOL  (acetaminophen) products sold in the [place] from 8 pills per day (4,000 mg) to 6 pills per day (3,000 mg). The dosing interval has also changed from 2 pills every 4-6 hours to 2 pills every 6 hours.  If you feel your pain relief is insufficient, you may take Tylenol/Acetaminophen in addition to your narcotic pain medication.   Be careful not to exceed 3,000 mg of Tylenol/Acetaminophen in a 24 hour period from all sources.  If you are taking extra strength Tylenol/acetaminophen (500 mg), the maximum dose is 6 tablets in 24 hours.  If you are taking regular strength acetaminophen (325 mg), the maximum dose is 9 tablets in 24 hours.  Tylenol 975mg given at 11:39AM.  Next dose given at 5:39PM, then follow package directions.    Call a doctor for any of the following:  Signs of infection (fever, growing tenderness at the surgery site, a large amount of drainage or bleeding, severe pain, foul-smelling drainage, redness, swelling).  It has been over 8 to 10 hours since surgery and you are still not able to urinate (pass water).  Headache for over 24 hours.  Signs of Covid-19 infection (temperature over 100 degrees, shortness of breath, cough, loss of taste/smell, generalized body aches, persistent headache, chills, sore throat, nausea/vomiting/diarrhea)  Your doctor is:  Dr. Jevon Velarde, Colon Rectal: [phone]                  Or dial [phone] and ask for the resident on call for:  Colon Rectal  For emergency care, call the:  [facility]:  [phone] (TTY for hearing impaired:  [phone])

## 2023-01-02 ENCOUNTER — ANESTHESIA EVENT (OUTPATIENT)
Dept: SURGERY | Facility: AMBULATORY SURGERY CENTER | Age: 23
End: 2023-01-02
Payer: COMMERCIAL

## 2023-01-02 RX ORDER — ONDANSETRON 4 MG/1
4 TABLET, ORALLY DISINTEGRATING ORAL EVERY 30 MIN PRN
Status: CANCELLED | OUTPATIENT
Start: 2023-01-02

## 2023-01-02 RX ORDER — SODIUM CHLORIDE, SODIUM LACTATE, POTASSIUM CHLORIDE, CALCIUM CHLORIDE 600; 310; 30; 20 MG/100ML; MG/100ML; MG/100ML; MG/100ML
INJECTION, SOLUTION INTRAVENOUS CONTINUOUS
Status: CANCELLED | OUTPATIENT
Start: 2023-01-02

## 2023-01-02 RX ORDER — FENTANYL CITRATE 50 UG/ML
25 INJECTION, SOLUTION INTRAMUSCULAR; INTRAVENOUS EVERY 5 MIN PRN
Status: CANCELLED | OUTPATIENT
Start: 2023-01-02

## 2023-01-02 RX ORDER — MEPERIDINE HYDROCHLORIDE 25 MG/ML
12.5 INJECTION INTRAMUSCULAR; INTRAVENOUS; SUBCUTANEOUS
Status: CANCELLED | OUTPATIENT
Start: 2023-01-02

## 2023-01-02 RX ORDER — FENTANYL CITRATE 50 UG/ML
25 INJECTION, SOLUTION INTRAMUSCULAR; INTRAVENOUS
Status: CANCELLED | OUTPATIENT
Start: 2023-01-02

## 2023-01-02 RX ORDER — ONDANSETRON 2 MG/ML
4 INJECTION INTRAMUSCULAR; INTRAVENOUS EVERY 30 MIN PRN
Status: CANCELLED | OUTPATIENT
Start: 2023-01-02

## 2023-01-02 RX ORDER — FENTANYL CITRATE 50 UG/ML
50 INJECTION, SOLUTION INTRAMUSCULAR; INTRAVENOUS EVERY 5 MIN PRN
Status: CANCELLED | OUTPATIENT
Start: 2023-01-02

## 2023-01-02 RX ORDER — OXYCODONE HYDROCHLORIDE 5 MG/1
10 TABLET ORAL EVERY 4 HOURS PRN
Status: CANCELLED | OUTPATIENT
Start: 2023-01-02

## 2023-01-02 RX ORDER — HYDROMORPHONE HYDROCHLORIDE 1 MG/ML
0.2 INJECTION, SOLUTION INTRAMUSCULAR; INTRAVENOUS; SUBCUTANEOUS EVERY 5 MIN PRN
Status: CANCELLED | OUTPATIENT
Start: 2023-01-02

## 2023-01-02 RX ORDER — OXYCODONE HYDROCHLORIDE 5 MG/1
5 TABLET ORAL EVERY 4 HOURS PRN
Status: CANCELLED | OUTPATIENT
Start: 2023-01-02

## 2023-01-02 RX ORDER — HYDROMORPHONE HYDROCHLORIDE 1 MG/ML
0.4 INJECTION, SOLUTION INTRAMUSCULAR; INTRAVENOUS; SUBCUTANEOUS EVERY 5 MIN PRN
Status: CANCELLED | OUTPATIENT
Start: 2023-01-02

## 2023-01-03 ENCOUNTER — ANESTHESIA (OUTPATIENT)
Dept: SURGERY | Facility: AMBULATORY SURGERY CENTER | Age: 23
End: 2023-01-03
Payer: COMMERCIAL

## 2023-01-03 ENCOUNTER — TELEPHONE (OUTPATIENT)
Dept: SURGERY | Facility: CLINIC | Age: 23
End: 2023-01-03

## 2023-01-03 ENCOUNTER — HOSPITAL ENCOUNTER (OUTPATIENT)
Facility: AMBULATORY SURGERY CENTER | Age: 23
Discharge: HOME | End: 2023-01-03
Attending: SURGERY
Payer: COMMERCIAL

## 2023-01-03 VITALS
RESPIRATION RATE: 14 BRPM | TEMPERATURE: 98 F | HEART RATE: 77 BPM | SYSTOLIC BLOOD PRESSURE: 117 MMHG | WEIGHT: 134 LBS | BODY MASS INDEX: 23.74 KG/M2 | OXYGEN SATURATION: 98 % | HEIGHT: 63 IN | DIASTOLIC BLOOD PRESSURE: 59 MMHG

## 2023-01-03 DIAGNOSIS — K64.4 SKIN TAGS, ANUS OR RECTUM: ICD-10-CM

## 2023-01-03 PROCEDURE — 46230 REMOVAL OF ANAL TAGS: CPT | Performed by: SURGERY

## 2023-01-03 PROCEDURE — 46230 REMOVAL OF ANAL TAGS: CPT

## 2023-01-03 PROCEDURE — 88304 TISSUE EXAM BY PATHOLOGIST: CPT | Mod: TC | Performed by: SURGERY

## 2023-01-03 RX ORDER — PROPOFOL 10 MG/ML
INJECTION, EMULSION INTRAVENOUS CONTINUOUS PRN
Status: DISCONTINUED | OUTPATIENT
Start: 2023-01-03 | End: 2023-01-03

## 2023-01-03 RX ORDER — ONDANSETRON 4 MG/1
4 TABLET, ORALLY DISINTEGRATING ORAL
Status: CANCELLED | OUTPATIENT
Start: 2023-01-03

## 2023-01-03 RX ORDER — ACETAMINOPHEN 325 MG/1
975 TABLET ORAL ONCE
Status: COMPLETED | OUTPATIENT
Start: 2023-01-03 | End: 2023-01-03

## 2023-01-03 RX ORDER — ACETAMINOPHEN 325 MG/1
975 TABLET ORAL ONCE
Status: DISCONTINUED | OUTPATIENT
Start: 2023-01-03 | End: 2023-01-04 | Stop reason: HOSPADM

## 2023-01-03 RX ORDER — OXYCODONE HYDROCHLORIDE 5 MG/1
5 TABLET ORAL EVERY 6 HOURS PRN
Qty: 6 TABLET | Refills: 0 | Status: SHIPPED | OUTPATIENT
Start: 2023-01-03 | End: 2023-01-06

## 2023-01-03 RX ORDER — ONDANSETRON 2 MG/ML
4 INJECTION INTRAMUSCULAR; INTRAVENOUS ONCE
Status: DISCONTINUED | OUTPATIENT
Start: 2023-01-03 | End: 2023-01-04 | Stop reason: HOSPADM

## 2023-01-03 RX ORDER — LIDOCAINE HYDROCHLORIDE 20 MG/ML
INJECTION, SOLUTION INFILTRATION; PERINEURAL PRN
Status: DISCONTINUED | OUTPATIENT
Start: 2023-01-03 | End: 2023-01-03

## 2023-01-03 RX ORDER — BUPIVACAINE HYDROCHLORIDE 2.5 MG/ML
INJECTION, SOLUTION INFILTRATION; PERINEURAL DAILY PRN
Status: DISCONTINUED | OUTPATIENT
Start: 2023-01-03 | End: 2023-01-03 | Stop reason: HOSPADM

## 2023-01-03 RX ORDER — ONDANSETRON 2 MG/ML
INJECTION INTRAMUSCULAR; INTRAVENOUS PRN
Status: DISCONTINUED | OUTPATIENT
Start: 2023-01-03 | End: 2023-01-03

## 2023-01-03 RX ORDER — METRONIDAZOLE 500 MG/100ML
500 INJECTION, SOLUTION INTRAVENOUS
Status: COMPLETED | OUTPATIENT
Start: 2023-01-03 | End: 2023-01-03

## 2023-01-03 RX ORDER — LIDOCAINE HYDROCHLORIDE AND EPINEPHRINE 10; 10 MG/ML; UG/ML
INJECTION, SOLUTION INFILTRATION; PERINEURAL DAILY PRN
Status: DISCONTINUED | OUTPATIENT
Start: 2023-01-03 | End: 2023-01-03 | Stop reason: HOSPADM

## 2023-01-03 RX ORDER — SODIUM CHLORIDE, SODIUM LACTATE, POTASSIUM CHLORIDE, CALCIUM CHLORIDE 600; 310; 30; 20 MG/100ML; MG/100ML; MG/100ML; MG/100ML
INJECTION, SOLUTION INTRAVENOUS CONTINUOUS
Status: DISCONTINUED | OUTPATIENT
Start: 2023-01-03 | End: 2023-01-04 | Stop reason: HOSPADM

## 2023-01-03 RX ORDER — PROPOFOL 10 MG/ML
INJECTION, EMULSION INTRAVENOUS PRN
Status: DISCONTINUED | OUTPATIENT
Start: 2023-01-03 | End: 2023-01-03

## 2023-01-03 RX ORDER — LIDOCAINE 40 MG/G
CREAM TOPICAL
Status: DISCONTINUED | OUTPATIENT
Start: 2023-01-03 | End: 2023-01-04 | Stop reason: HOSPADM

## 2023-01-03 RX ADMIN — PROPOFOL 150 MCG/KG/MIN: 10 INJECTION, EMULSION INTRAVENOUS at 12:16

## 2023-01-03 RX ADMIN — ONDANSETRON 4 MG: 2 INJECTION INTRAMUSCULAR; INTRAVENOUS at 12:16

## 2023-01-03 RX ADMIN — PROPOFOL 50 MG: 10 INJECTION, EMULSION INTRAVENOUS at 12:16

## 2023-01-03 RX ADMIN — SODIUM CHLORIDE, SODIUM LACTATE, POTASSIUM CHLORIDE, CALCIUM CHLORIDE: 600; 310; 30; 20 INJECTION, SOLUTION INTRAVENOUS at 11:46

## 2023-01-03 RX ADMIN — PROPOFOL 20 MG: 10 INJECTION, EMULSION INTRAVENOUS at 12:25

## 2023-01-03 RX ADMIN — LIDOCAINE HYDROCHLORIDE 50 MG: 20 INJECTION, SOLUTION INFILTRATION; PERINEURAL at 12:16

## 2023-01-03 RX ADMIN — ACETAMINOPHEN 975 MG: 325 TABLET ORAL at 11:39

## 2023-01-03 RX ADMIN — METRONIDAZOLE 500 MG: 500 INJECTION, SOLUTION INTRAVENOUS at 11:47

## 2023-01-03 NOTE — OP NOTE
"[facility] Colorectal Surgery Operative Report    PREOPERATIVE DIAGNOSIS:  1. Symptomatic anal skin tags    POSTOPERATIVE DIAGNOSIS:   1. Symptomatic anal skin tag x2    PROCEDURE:  1. Evaluation under anesthesia.  2. Excision of anal skin tag x2    ANESTHESIA: MAC plus local anesthesia.    SURGEON:  Jevon Velarde M.D.    ASSISTANT(S): none    INDICATIONS FOR PROCEDURE  Saulo Cleary is a 22 year old male who presented with symptomatic anal skin tags that aresignificantly interfering with local hygeine. I thoroughly discussed the risks, benefits, and alternatives of operative treatment with the patient and he/she agreed to proceed.    General risks related to anorectal surgery were reviewed with the patient. These include, but are not limited to urinary retention, abscess, infection, bleeding, chronic pain, anal stenosis, fistula, fissure, and fecal incontinence.     OPERATIVE PROCEDURE: After obtaining informed consent, the patient was brought to the operating room and placed in the prone jackknife position. Appropriate preoperative mechanical deep venous thrombosis prophylaxis, as well as preoperative prophylactic parenteral antibiotics were given. MAC anesthesia was gently induced. Bilateral lower extremity pneumatic compression devices were applied and all pressure points were cushioned. The perianal area was then preped and draped in the standard sterile fashion. After a \"time-out\" was performed, digital rectal exam and anoscopy were performed. A total of 35 mL of Bupivacaine 0.25% without epinephrine was injected as a pudendal block. Anal skin tags located at the anterior and posterior midline positions were excised with monopolar electrocautery. Care was taken to not injure any muscle fibers of the anal sphincter complex. Specimens were sent for permanent pathology. All wounds were closed with running 4-0 Monocryl sutures. The distal rectum and anal canal were irrigated. Hemostasis was achieved. Instrument, " sponge, and needle counts were all correct as reported to me. Bacitracin was applied, as well as a sterile dressing. The patient tolerated the procedure well.    COMPLICATIONS: none, immediately.    ESTIMATED BLOOD LOSS: 1 mL    SPECIMEN(S): Anterior (x1) and posterior  (x1) anal skin tags sent together    DRAINS/TUBES:  None.    OPERATIVE FINDINGS: Anterior and posterior anal skin tags (x2 total)    DISPOSITION: PACU.      Jevon Velarde MD  Division of Colon and Rectal Surgery  [facility]  [phone]      CC:  [facility] Surgery billing.  Monet Bond NP.

## 2023-01-03 NOTE — ANESTHESIA POSTPROCEDURE EVALUATION
Patient: Saulo Cleary    Procedure: Procedure(s):  EXAM UNDER ANESTHESIA, ANUS, anal skin tag removal  REMOVAL, SKIN TAG, ANUS       Anesthesia Type:  MAC    Note:  Disposition: Outpatient   Postop Pain Control: Uneventful            Sign Out: Well controlled pain   PONV: No   Neuro/Psych: Uneventful            Sign Out: Acceptable/Baseline neuro status   Airway/Respiratory: Uneventful            Sign Out: Acceptable/Baseline resp. status   CV/Hemodynamics: Uneventful            Sign Out: Acceptable CV status; No obvious hypovolemia; No obvious fluid overload   Other NRE: NONE   DID A NON-ROUTINE EVENT OCCUR? No           Last vitals:  Vitals Value Taken Time   /59 01/03/23 1300   Temp 36.7  C (98  F) 01/03/23 1300   Pulse 77 01/03/23 1300   Resp 14 01/03/23 1300   SpO2 98 % 01/03/23 1300       Electronically Signed By: Elliott Lake MD  January 3, 2023  1:46 PM

## 2023-01-03 NOTE — ANESTHESIA CARE TRANSFER NOTE
Patient: Saulo Cleary    Procedure: Procedure(s):  EXAM UNDER ANESTHESIA, ANUS, anal skin tag removal  REMOVAL, SKIN TAG, ANUS       Diagnosis: Skin tags, anus or rectum [K64.4]  Diagnosis Additional Information: No value filed.    Anesthesia Type:   MAC     Note:    Oropharynx: oropharynx clear of all foreign objects and spontaneously breathing  Level of Consciousness: awake  Oxygen Supplementation: room air    Independent Airway: airway patency satisfactory and stable  Dentition: dentition unchanged  Vital Signs Stable: post-procedure vital signs reviewed and stable  Report to RN Given: handoff report given  Patient transferred to: Phase II    Handoff Report: Identifed the Patient, Identified the Reponsible Provider, Reviewed the pertinent medical history, Discussed the surgical course, Reviewed Intra-OP anesthesia mangement and issues during anesthesia, Set expectations for post-procedure period and Allowed opportunity for questions and acknowledgement of understanding      Vitals:  Vitals Value Taken Time   BP 97/45 01/03/23 1243   Temp 36.7  C (98  F) 01/03/23 1243   Pulse 82 01/03/23 1243   Resp 13 01/03/23 1243   SpO2 97 % 01/03/23 1243       Electronically Signed By: ROQUE Ling CRNA  January 3, 2023  12:47 PM

## 2023-01-03 NOTE — ANESTHESIA PREPROCEDURE EVALUATION
Anesthesia Pre-Procedure Evaluation    Patient: Saulo Cleary   MRN: [id number] : 2000        Procedure : Procedure(s):  EXAM UNDER ANESTHESIA, ANUS, anal skin tag removal  REMOVAL, SKIN TAG, ANUS          Past Medical History:   Diagnosis Date     ADHD (attention deficit hyperactivity disorder)      Allergic rhinitis, cause unspecified      Anxiety state, unspecified      Depression      Insomnia      Overweight(278.02)      Sialoadenitis      Unspecified asthma(493.90)       Past Surgical History:   Procedure Laterality Date     wisdom teeth        No Known Allergies   Social History     Tobacco Use     Smoking status: Never     Smokeless tobacco: Never   Substance Use Topics     Alcohol use: Never      Wt Readings from Last 1 Encounters:   23 60.8 kg (134 lb)        Anesthesia Evaluation            ROS/MED HX  ENT/Pulmonary:     (+) asthma     Neurologic:       Cardiovascular:       METS/Exercise Tolerance:     Hematologic:       Musculoskeletal:       GI/Hepatic:       Renal/Genitourinary:       Endo:       Psychiatric/Substance Use:       Infectious Disease:       Malignancy:       Other:            Physical Exam    Airway        Mallampati: I       Respiratory Devices and Support         Dental           Cardiovascular          Rhythm and rate: regular     Pulmonary           breath sounds clear to auscultation           OUTSIDE LABS:  CBC:   Lab Results   Component Value Date    WBC 4.8 10/01/2020    WBC 4.9 2019    HGB 15.7 10/01/2020    HGB 15.6 2019    HCT 46.3 10/01/2020    HCT 45.4 2019     10/01/2020     2019     BMP:   Lab Results   Component Value Date     10/01/2020     2019    POTASSIUM 4.5 10/01/2020    POTASSIUM 4.5 2019    CHLORIDE 104 10/01/2020    CHLORIDE 103 2019    CO2 25 10/01/2020    CO2 30 2019    BUN 10 10/01/2020    BUN 9 2019    CR 0.78 10/01/2020    CR 0.75 2019    GLC 72 10/01/2020     GLC 64 (L) 12/05/2019     COAGS: No results found for: PTT, INR, FIBR  POC: No results found for: BGM, HCG, HCGS  HEPATIC:   Lab Results   Component Value Date    ALBUMIN 4.2 10/01/2020    PROTTOTAL 7.4 10/01/2020    ALT <9 10/01/2020    AST 17 10/01/2020    ALKPHOS 91 10/01/2020    BILITOTAL 0.3 10/01/2020     OTHER:   Lab Results   Component Value Date    NILDA 9.6 10/01/2020    TSH 1.23 10/01/2020       Anesthesia Plan    ASA Status:  2   NPO Status:  NPO Appropriate    Anesthesia Type: MAC.              Consents    Anesthesia Plan(s) and associated risks, benefits, and realistic alternatives discussed. Questions answered and patient/representative(s) expressed understanding.    - Discussed:     - Discussed with:  Patient         Postoperative Care            Comments:                Babatunde Cisse MD

## 2023-01-03 NOTE — TELEPHONE ENCOUNTER
Received vm msg from patient's mom concerned that they may be turned away for patient's surgery today due to a delay with insurance switch. I called back, left vm msg, said that on our end I do not think this would be an issue but ultimately this needs to be squared away with their insurance company.    Stacey Jackson  Daniella-op Coordinator  [facility]  Direct Phone: [phone]

## 2023-01-04 LAB
PATH REPORT.COMMENTS IMP SPEC: NORMAL
PATH REPORT.COMMENTS IMP SPEC: NORMAL
PATH REPORT.FINAL DX SPEC: NORMAL
PATH REPORT.GROSS SPEC: NORMAL
PATH REPORT.MICROSCOPIC SPEC OTHER STN: NORMAL
PATH REPORT.RELEVANT HX SPEC: NORMAL
PHOTO IMAGE: NORMAL

## 2023-01-04 PROCEDURE — 88304 TISSUE EXAM BY PATHOLOGIST: CPT | Mod: 26 | Performed by: PATHOLOGY

## 2023-01-09 ENCOUNTER — MYC MEDICAL ADVICE (OUTPATIENT)
Dept: SURGERY | Facility: CLINIC | Age: 23
End: 2023-01-09

## 2023-01-09 NOTE — PROGRESS NOTES
"Colon and Rectal Surgery Postoperative Clinic Note    RE: Saulo Cleary  : 2000  ZOHRA: 2023    Saulo Cleary is a very pleasant 22 year old male now status post examination under anesthesia with skin tag excision x2 with Dr. Velarde on 1/3/23.     Interval history: Doing well. No pain anymore. Having a small amount of discharge. Feels like stitches are coming out. No fecal incontinence.    Physical Examination: Exam was chaperoned by Rhea Lopez, EMT   /71 (BP Location: Left arm, Patient Position: Sitting, Cuff Size: Adult Regular)   Pulse 81   Ht 5' 3\"   Wt 132 lb 6.4 oz   SpO2 96%   BMI 23.45 kg/m    General: alert, oriented, in no acute distress, sitting comfortably  HEENT: moist mucous membranes  Perianal external examination:  Surgical wounds in anterior and posterior midlines open with healthy granulation tissue. Monocryl suture trimmed.     Assessment/Plan:  22 year old male now status post examination under anesthesia with skin tag excision x2 with Dr. Velarde on 1/3/23. Ike is doing well. Discussed that drainage may continue until wounds are fully healed. He may notice more sutures coming out; this is normal. Contact our clinic with any questions or concerns. Follow up only as needed. Patient's questions were answered to his stated satisfaction and he is in agreement with this plan.     Medical history:  Past Medical History:   Diagnosis Date     ADHD (attention deficit hyperactivity disorder)      Allergic rhinitis, cause unspecified      Anxiety state, unspecified      Depression      Insomnia      Overweight(278.02)      Sialoadenitis      Unspecified asthma(493.90)        Surgical history:  Past Surgical History:   Procedure Laterality Date     EXAM UNDER ANESTHESIA ANUS N/A 1/3/2023    Procedure: EXAM UNDER ANESTHESIA, ANUS, anal skin tag removal;  Surgeon: Jevon Velarde MD;  Location: [facility] OR     REMOVE TAG ANAL N/A 1/3/2023    Procedure: REMOVAL, SKIN TAG, ANUS;  " Surgeon: Jevon Velarde MD;  Location: [facility] OR     wisdom teeth         Problem list:    Patient Active Problem List    Diagnosis Date Noted     Skin tags, anus or rectum 12/16/2022     Priority: Medium     Added automatically from request for surgery [id number]       Moderate major depression (H) 12/05/2019     Priority: Medium     Controlled substance agreement signed 3/2019 04/12/2019     Priority: Medium     Intermittent asthma, well controlled 05/04/2018     Priority: Medium     Anxiety      Priority: Medium     Created by Conversion  Replacement Utility updated for latest IMO load         ADHD, predominantly inattentive type 10/11/2017     Priority: Medium     Asthma      Priority: Medium     Created by Conversion         Allergic Rhinitis      Priority: Medium     Created by Conversion  Replacement Utility updated for latest IMO load           Medications:  Current Outpatient Medications   Medication Sig Dispense Refill     albuterol (VENTOLIN HFA) 90 mcg/actuation inhaler [ALBUTEROL (VENTOLIN HFA) 90 MCG/ACTUATION INHALER] INHALE TWO PUFFS BY MOUTH EVERY 4 HOURS AS NEEDED FOR WHEEZING OR COUGH 1 Inhaler 4     augmented betamethasone dipropionate (DIPROLENE-AF) 0.05 % external ointment Apply topically 2 times daily 50 g 0     calcipotriene (DOVONOX) 0.005 % external ointment Apply to area of rash 2 time a day 120 g 0     cloNIDine HCL (CATAPRES) 0.1 MG tablet Take 0.1 mg by mouth as needed       desvenlafaxine (PRISTIQ) 50 MG 24 hr tablet Take 50 mg by mouth every morning Take with 25 mg tablet for total dose of 75 mg       desvenlafaxine succinate (PRISTIQ) 25 MG 24 hr tablet Take 25 mg by mouth every morning Take with 50 mg tablet for total dose of 75 mg       fluticasone (FLOVENT HFA) 44 mcg/actuation inhaler [FLUTICASONE (FLOVENT HFA) 44 MCG/ACTUATION INHALER] Inhale 2 puffs 2 (two) times a day. (Patient taking differently: Inhale 2 puffs into the lungs as needed) 1 Inhaler 4       Allergies:  No Known  "Allergies    Family history:  Family History   Adopted: Yes   Problem Relation Age of Onset     Unknown/Adopted Mother      Unknown/Adopted Father      Anesthesia Reaction No family hx of      Thrombosis No family hx of        Social history:  Social History     Tobacco Use     Smoking status: Never     Smokeless tobacco: Never   Substance Use Topics     Alcohol use: Never     Marital status: single.    Nursing Notes:   Rhea Lopez, EMT  1/16/2023  4:04 PM  Signed  Chief Complaint   Patient presents with     Post-op Visit     Post op, DOS: 1/3/23       Vitals:    01/16/23 1603   BP: 122/71   BP Location: Left arm   Patient Position: Sitting   Cuff Size: Adult Regular   Pulse: 81   SpO2: 96%   Weight: 60.1 kg (132 lb 6.4 oz)   Height: 1.6 m (5' 3\")       Body mass index is 23.45 kg/m .                          Rhea Lopez, EMT         15 minutes spent on the date of the encounter doing chart review, history and exam, documentation and further activities as noted above.   This is a postop visit.      Rachel Warren PA-C  Colon and Rectal Surgery  [facility]    "

## 2023-01-16 ENCOUNTER — OFFICE VISIT (OUTPATIENT)
Dept: SURGERY | Facility: CLINIC | Age: 23
End: 2023-01-16
Payer: COMMERCIAL

## 2023-01-16 VITALS
HEART RATE: 81 BPM | DIASTOLIC BLOOD PRESSURE: 71 MMHG | OXYGEN SATURATION: 96 % | WEIGHT: 132.4 LBS | SYSTOLIC BLOOD PRESSURE: 122 MMHG | HEIGHT: 63 IN | BODY MASS INDEX: 23.46 KG/M2

## 2023-01-16 DIAGNOSIS — Z09 FOLLOW-UP EXAMINATION AFTER COLORECTAL SURGERY: Primary | ICD-10-CM

## 2023-01-16 PROCEDURE — 99024 POSTOP FOLLOW-UP VISIT: CPT

## 2023-01-16 ASSESSMENT — PAIN SCALES - GENERAL: PAINLEVEL: NO PAIN (0)

## 2023-01-16 NOTE — LETTER
"2023       RE: Saulo Cleary  [address] [facility] [id number]     Dear Colleague,    Thank you for referring your patient, Saulo Cleary, to the [facility] COLON AND RECTAL SURGERY CLINIC [place] at [facility]. Please see a copy of my visit note below.    Colon and Rectal Surgery Postoperative Clinic Note    RE: Saulo Cleary  : 2000  ZOHRA: 2023    Saulo Cleary is a very pleasant 22 year old male now status post examination under anesthesia with skin tag excision x2 with Dr. Velarde on 1/3/23.     Interval history: Doing well. No pain anymore. Having a small amount of discharge. Feels like stitches are coming out. No fecal incontinence.    Physical Examination: Exam was chaperoned by Rhea Lopez, EMT   /71 (BP Location: Left arm, Patient Position: Sitting, Cuff Size: Adult Regular)   Pulse 81   Ht 5' 3\"   Wt 132 lb 6.4 oz   SpO2 96%   BMI 23.45 kg/m    General: alert, oriented, in no acute distress, sitting comfortably  HEENT: moist mucous membranes  Perianal external examination:  Surgical wounds in anterior and posterior midlines open with healthy granulation tissue. Monocryl suture trimmed.     Assessment/Plan:  22 year old male now status post examination under anesthesia with skin tag excision x2 with Dr. Velarde on 1/3/23. Ike is doing well. Discussed that drainage may continue until wounds are fully healed. He may notice more sutures coming out; this is normal. Contact our clinic with any questions or concerns. Follow up only as needed. Patient's questions were answered to his stated satisfaction and he is in agreement with this plan.     Medical history:  Past Medical History:   Diagnosis Date     ADHD (attention deficit hyperactivity disorder)      Allergic rhinitis, cause unspecified      Anxiety state, unspecified      Depression      Insomnia      Overweight(278.02)      Sialoadenitis      " Unspecified asthma(493.90)        Surgical history:  Past Surgical History:   Procedure Laterality Date     EXAM UNDER ANESTHESIA ANUS N/A 1/3/2023    Procedure: EXAM UNDER ANESTHESIA, ANUS, anal skin tag removal;  Surgeon: Jevon Velarde MD;  Location: [facility] OR     REMOVE TAG ANAL N/A 1/3/2023    Procedure: REMOVAL, SKIN TAG, ANUS;  Surgeon: Jevon Velarde MD;  Location: [facility] OR     wisdom teeth         Problem list:    Patient Active Problem List    Diagnosis Date Noted     Skin tags, anus or rectum 12/16/2022     Priority: Medium     Added automatically from request for surgery [id number]       Moderate major depression (H) 12/05/2019     Priority: Medium     Controlled substance agreement signed 3/2019 04/12/2019     Priority: Medium     Intermittent asthma, well controlled 05/04/2018     Priority: Medium     Anxiety      Priority: Medium     Created by Conversion  Replacement Utility updated for latest IMO load         ADHD, predominantly inattentive type 10/11/2017     Priority: Medium     Asthma      Priority: Medium     Created by Conversion         Allergic Rhinitis      Priority: Medium     Created by Conversion  Replacement Utility updated for latest IMO load           Medications:  Current Outpatient Medications   Medication Sig Dispense Refill     albuterol (VENTOLIN HFA) 90 mcg/actuation inhaler [ALBUTEROL (VENTOLIN HFA) 90 MCG/ACTUATION INHALER] INHALE TWO PUFFS BY MOUTH EVERY 4 HOURS AS NEEDED FOR WHEEZING OR COUGH 1 Inhaler 4     augmented betamethasone dipropionate (DIPROLENE-AF) 0.05 % external ointment Apply topically 2 times daily 50 g 0     calcipotriene (DOVONOX) 0.005 % external ointment Apply to area of rash 2 time a day 120 g 0     cloNIDine HCL (CATAPRES) 0.1 MG tablet Take 0.1 mg by mouth as needed       desvenlafaxine (PRISTIQ) 50 MG 24 hr tablet Take 50 mg by mouth every morning Take with 25 mg tablet for total dose of 75 mg       desvenlafaxine succinate (PRISTIQ) 25 MG 24 hr tablet  "Take 25 mg by mouth every morning Take with 50 mg tablet for total dose of 75 mg       fluticasone (FLOVENT HFA) 44 mcg/actuation inhaler [FLUTICASONE (FLOVENT HFA) 44 MCG/ACTUATION INHALER] Inhale 2 puffs 2 (two) times a day. (Patient taking differently: Inhale 2 puffs into the lungs as needed) 1 Inhaler 4       Allergies:  No Known Allergies    Family history:  Family History   Adopted: Yes   Problem Relation Age of Onset     Unknown/Adopted Mother      Unknown/Adopted Father      Anesthesia Reaction No family hx of      Thrombosis No family hx of        Social history:  Social History     Tobacco Use     Smoking status: Never     Smokeless tobacco: Never   Substance Use Topics     Alcohol use: Never     Marital status: single.    Nursing Notes:   Rhea Lopez, EMT  1/16/2023  4:04 PM  Signed  Chief Complaint   Patient presents with     Post-op Visit     Post op, DOS: 1/3/23       Vitals:    01/16/23 1603   BP: 122/71   BP Location: Left arm   Patient Position: Sitting   Cuff Size: Adult Regular   Pulse: 81   SpO2: 96%   Weight: 60.1 kg (132 lb 6.4 oz)   Height: 1.6 m (5' 3\")       Body mass index is 23.45 kg/m .                          Rhea Lopez, EMT         15 minutes spent on the date of the encounter doing chart review, history and exam, documentation and further activities as noted above.   This is a postop visit.            Again, thank you for allowing me to participate in the care of your patient.      Sincerely,    Rachel Warren PA-C      "

## 2023-01-16 NOTE — NURSING NOTE
"Chief Complaint   Patient presents with     Post-op Visit     Post op, DOS: 1/3/23       Vitals:    01/16/23 1603   BP: 122/71   BP Location: Left arm   Patient Position: Sitting   Cuff Size: Adult Regular   Pulse: 81   SpO2: 96%   Weight: 60.1 kg (132 lb 6.4 oz)   Height: 1.6 m (5' 3\")       Body mass index is 23.45 kg/m .                          Rhea Lopez, EMT    "

## 2023-05-08 ENCOUNTER — HEALTH MAINTENANCE LETTER (OUTPATIENT)
Age: 23
End: 2023-05-08

## 2023-08-16 RX ORDER — KETOCONAZOLE 20 MG/G
CREAM TOPICAL
COMMUNITY
Start: 2022-11-02 | End: 2023-09-07

## 2023-08-16 RX ORDER — CLOBETASOL PROPIONATE 0.5 MG/G
CREAM TOPICAL
COMMUNITY
Start: 2022-11-02 | End: 2023-09-07

## 2023-09-07 ENCOUNTER — OFFICE VISIT (OUTPATIENT)
Dept: FAMILY MEDICINE | Facility: CLINIC | Age: 23
End: 2023-09-07
Payer: COMMERCIAL

## 2023-09-07 VITALS
RESPIRATION RATE: 16 BRPM | TEMPERATURE: 97.5 F | BODY MASS INDEX: 21.26 KG/M2 | HEART RATE: 72 BPM | DIASTOLIC BLOOD PRESSURE: 64 MMHG | WEIGHT: 120 LBS | SYSTOLIC BLOOD PRESSURE: 98 MMHG | HEIGHT: 63 IN

## 2023-09-07 DIAGNOSIS — Z23 NEED FOR DIPHTHERIA-TETANUS-PERTUSSIS (TDAP) VACCINE: ICD-10-CM

## 2023-09-07 DIAGNOSIS — F33.41 RECURRENT MAJOR DEPRESSIVE DISORDER, IN PARTIAL REMISSION (H): ICD-10-CM

## 2023-09-07 DIAGNOSIS — Z00.00 ANNUAL PHYSICAL EXAM: Primary | ICD-10-CM

## 2023-09-07 DIAGNOSIS — Z23 NEED FOR IMMUNIZATION AGAINST INFLUENZA: ICD-10-CM

## 2023-09-07 PROBLEM — F90.0 ADHD, PREDOMINANTLY INATTENTIVE TYPE: Status: RESOLVED | Noted: 2017-10-11 | Resolved: 2023-09-07

## 2023-09-07 PROBLEM — K64.4 SKIN TAGS, ANUS OR RECTUM: Status: RESOLVED | Noted: 2022-12-16 | Resolved: 2023-09-07

## 2023-09-07 PROBLEM — Z79.899 CONTROLLED SUBSTANCE AGREEMENT SIGNED: Status: RESOLVED | Noted: 2019-04-12 | Resolved: 2023-09-07

## 2023-09-07 PROCEDURE — 99395 PREV VISIT EST AGE 18-39: CPT | Mod: 25 | Performed by: FAMILY MEDICINE

## 2023-09-07 PROCEDURE — 90472 IMMUNIZATION ADMIN EACH ADD: CPT | Performed by: FAMILY MEDICINE

## 2023-09-07 PROCEDURE — 90471 IMMUNIZATION ADMIN: CPT | Performed by: FAMILY MEDICINE

## 2023-09-07 PROCEDURE — 90686 IIV4 VACC NO PRSV 0.5 ML IM: CPT | Performed by: FAMILY MEDICINE

## 2023-09-07 PROCEDURE — 90715 TDAP VACCINE 7 YRS/> IM: CPT | Performed by: FAMILY MEDICINE

## 2023-09-07 ASSESSMENT — ENCOUNTER SYMPTOMS
CHILLS: 0
HEADACHES: 0
MYALGIAS: 0
DIARRHEA: 0
DIZZINESS: 0
SHORTNESS OF BREATH: 0
COUGH: 0
HEMATOCHEZIA: 0
PALPITATIONS: 0
ARTHRALGIAS: 0
PARESTHESIAS: 0
SORE THROAT: 0
NERVOUS/ANXIOUS: 0
DYSURIA: 0
JOINT SWELLING: 0
CONSTIPATION: 0
ABDOMINAL PAIN: 0
HEARTBURN: 0
NAUSEA: 0
EYE PAIN: 0
FREQUENCY: 0
HEMATURIA: 0
FEVER: 0
WEAKNESS: 0

## 2023-09-07 ASSESSMENT — ASTHMA QUESTIONNAIRES: ACT_TOTALSCORE: 25

## 2023-09-07 ASSESSMENT — PATIENT HEALTH QUESTIONNAIRE - PHQ9
SUM OF ALL RESPONSES TO PHQ QUESTIONS 1-9: 7
10. IF YOU CHECKED OFF ANY PROBLEMS, HOW DIFFICULT HAVE THESE PROBLEMS MADE IT FOR YOU TO DO YOUR WORK, TAKE CARE OF THINGS AT HOME, OR GET ALONG WITH OTHER PEOPLE: SOMEWHAT DIFFICULT
SUM OF ALL RESPONSES TO PHQ QUESTIONS 1-9: 7

## 2023-09-07 NOTE — PROGRESS NOTES
SUBJECTIVE:   CC: Ike is an 23 year old who presents for preventative health visit.       9/7/2023    11:49 AM   Additional Questions   Roomed by Magalie       Healthy Habits:     Getting at least 3 servings of Calcium per day:  Yes    Bi-annual eye exam:  Yes    Dental care twice a year:  Yes    Sleep apnea or symptoms of sleep apnea:  None    Diet:  Regular (no restrictions)    Frequency of exercise:  6-7 days/week    Duration of exercise:  Greater than 60 minutes    Taking medications regularly:  Yes    Medication side effects:  None    Additional concerns today:  No    Patient exercises regularly with weightlifting, does not do cardio.  He is doing has a side to go back to school and is doing general recommendation classes, unsure at what he will pursue.    Asthma: Patient has albuterol and Flovent at home, does not use either regularly, has been months since last use of medication.  Asthma seems to be most bothersome when doing intense cardio, which he is not doing currently.    MDD: Patient has general anxiety and depression, being followed by psychiatrist and is currently on Pristiq 75 mg daily with clonidine 0.1 mg to help him sleep.    Today's PHQ-9 Score:       9/7/2023    11:37 AM   PHQ-9 SCORE   PHQ-9 Total Score MyChart 7 (Mild depression)   PHQ-9 Total Score 7       Have you ever done Advance Care Planning? (For example, a Health Directive, POLST, or a discussion with a medical provider or your loved ones about your wishes): No, advance care planning information given to patient to review.  Patient declined advance care planning discussion at this time.    Social History     Tobacco Use    Smoking status: Never    Smokeless tobacco: Never   Substance Use Topics    Alcohol use: Never             9/7/2023    11:38 AM   Alcohol Use   Prescreen: >3 drinks/day or >7 drinks/week? No       Last PSA: No results found for: PSA    Reviewed orders with patient. Reviewed health maintenance and updated orders  "accordingly - Yes      Reviewed and updated as needed this visit by clinical staff   Tobacco  Allergies  Meds              Reviewed and updated as needed this visit by Provider                     Review of Systems   Constitutional:  Negative for chills and fever.   HENT:  Negative for congestion, ear pain, hearing loss and sore throat.    Eyes:  Negative for pain and visual disturbance.   Respiratory:  Negative for cough and shortness of breath.    Cardiovascular:  Negative for chest pain, palpitations and peripheral edema.   Gastrointestinal:  Negative for abdominal pain, constipation, diarrhea, heartburn, hematochezia and nausea.   Genitourinary:  Negative for dysuria, frequency, genital sores, hematuria and urgency.   Musculoskeletal:  Negative for arthralgias, joint swelling and myalgias.   Skin:  Negative for rash.   Neurological:  Negative for dizziness, weakness, headaches and paresthesias.   Psychiatric/Behavioral:  Negative for mood changes. The patient is not nervous/anxious.          OBJECTIVE:   BP 98/64 (BP Location: Left arm, Patient Position: Sitting, Cuff Size: Adult Regular)   Pulse 72   Temp 97.5  F (36.4  C) (Temporal)   Resp 16   Ht 1.594 m (5' 2.75\")   Wt 54.4 kg (120 lb)   BMI 21.43 kg/m      Physical Exam  GENERAL: healthy, alert and no distress  EYES: Eyes grossly normal to inspection, PERRL and conjunctivae and sclerae normal  HENT: ear canals and TM's normal, nose and mouth without ulcers or lesions  NECK: no adenopathy, no asymmetry, masses, or scars and thyroid normal to palpation  RESP: lungs clear to auscultation - no rales, rhonchi or wheezes  CV: regular rate and rhythm, normal S1 S2, no S3 or S4, no murmur, click or rub, no peripheral edema and peripheral pulses strong  ABDOMEN: soft, nontender, no hepatosplenomegaly, no masses and bowel sounds normal  MS: no gross musculoskeletal defects noted, no edema  SKIN: no suspicious lesions or rashes  NEURO: Normal strength and " tone, mentation intact and speech normal  PSYCH: mentation appears normal, affect normal/bright        ASSESSMENT/PLAN:   1. Annual physical exam  Advised healthy lifestyle.    2. Recurrent major depressive disorder, in partial remission (H)  Controlled and being followed by psychiatry.  Continue to monitor.    3. Need for diphtheria-tetanus-pertussis (Tdap) vaccine  - TDAP 10-64Y (ADACEL,BOOSTRIX)    4. Need for immunization against influenza  - INFLUENZA VACCINE >6 MONTHS (AFLURIA/FLUZONE)            COUNSELING:   Reviewed preventive health counseling, as reflected in patient instructions       Regular exercise       Healthy diet/nutrition        He reports that he has never smoked. He has never used smokeless tobacco.            Hernandez Gaston MD  [facility] submitted by the patient for this visit:  Patient Health Questionnaire (Submitted on 9/7/2023)  If you checked off any problems, how difficult have these problems made it for you to do your work, take care of things at home, or get along with other people?: Somewhat difficult  PHQ9 TOTAL SCORE: 7

## 2023-09-20 ENCOUNTER — TRANSFERRED RECORDS (OUTPATIENT)
Dept: HEALTH INFORMATION MANAGEMENT | Facility: CLINIC | Age: 23
End: 2023-09-20

## 2023-09-26 ENCOUNTER — TRANSFERRED RECORDS (OUTPATIENT)
Dept: HEALTH INFORMATION MANAGEMENT | Facility: CLINIC | Age: 23
End: 2023-09-26

## 2023-10-05 ENCOUNTER — OFFICE VISIT (OUTPATIENT)
Dept: FAMILY MEDICINE | Facility: CLINIC | Age: 23
End: 2023-10-05
Payer: COMMERCIAL

## 2023-10-05 VITALS
TEMPERATURE: 98.1 F | BODY MASS INDEX: 22.17 KG/M2 | WEIGHT: 125.13 LBS | RESPIRATION RATE: 12 BRPM | OXYGEN SATURATION: 97 % | HEART RATE: 70 BPM | SYSTOLIC BLOOD PRESSURE: 106 MMHG | DIASTOLIC BLOOD PRESSURE: 71 MMHG | HEIGHT: 63 IN

## 2023-10-05 DIAGNOSIS — H57.89 RED EYE: Primary | ICD-10-CM

## 2023-10-05 PROCEDURE — 99213 OFFICE O/P EST LOW 20 MIN: CPT

## 2023-10-05 ASSESSMENT — ENCOUNTER SYMPTOMS: EYE PAIN: 1

## 2023-10-05 NOTE — ASSESSMENT & PLAN NOTE
Outside records unable to be viewed today, but patient does present with a list of the medications he was prescribed.  We discussed that he was prescribed first-line medications for conjunctivitis.  With symptom duration of 2 weeks, I would anticipate an improvement in symptoms, not worsening.  Other differentials considered today include foreign body, corneal injury, preseptal cellulitis versus orbital cellulitis.  He does not have any red flag symptoms concerning for increased intraocular pressure, such as vision changes/floaters, intense headache with nausea.  He has no pain with movement of the eye, weakness of the eye muscles or proptosis concerning for orbital cellulitis.  Strongly considered preseptal cellulitis at this time, however review his medications show that he was initiated on Augmentin, which is considered first-line treatment of such, and would anticipate improvement in symptoms with this medication.  At this time, would recommend urgent evaluation by ophthalmology.  Patient does have an established eye doctor, and will reach out to them after our visit.  We discussed that if he is unable to secure an appointment in the next day or so, I would recommend CT with contrast to assess, he will reach out to me if he is in need.  Additionally, reviewed reasons to return to care, including any worsening of his symptoms, vision changes, floaters, or intense headache with nausea.  Patient expressed understanding of and agreement this plan.  All questions were answered.

## 2023-10-05 NOTE — PROGRESS NOTES
Assessment & Plan   Problem List Items Addressed This Visit          Other    Red eye - Primary     Outside records unable to be viewed today, but patient does present with a list of the medications he was prescribed.  We discussed that he was prescribed first-line medications for conjunctivitis.  With symptom duration of 2 weeks, I would anticipate an improvement in symptoms, not worsening.  Other differentials considered today include foreign body, corneal injury, preseptal cellulitis versus orbital cellulitis.  He does not have any red flag symptoms concerning for increased intraocular pressure, such as vision changes/floaters, intense headache with nausea.  He has no pain with movement of the eye, weakness of the eye muscles or proptosis concerning for orbital cellulitis.  Strongly considered preseptal cellulitis at this time, however review his medications show that he was initiated on Augmentin, which is considered first-line treatment of such, and would anticipate improvement in symptoms with this medication.  At this time, would recommend urgent evaluation by ophthalmology.  Patient does have an established eye doctor, and will reach out to them after our visit.  We discussed that if he is unable to secure an appointment in the next day or so, I would recommend CT with contrast to assess, he will reach out to me if he is in need.  Additionally, reviewed reasons to return to care, including any worsening of his symptoms, vision changes, floaters, or intense headache with nausea.  Patient expressed understanding of and agreement this plan.  All questions were answered.           ROQUE Galvez CNP [facility]    Christian Ramires is a 23 year old, presenting for the following health issues:  Eye Problem (RT eye red, painful, drainage, itching. Sx for 2 weeks has been treated and not helping.)      10/5/2023     2:35 PM   Additional Questions   Roomed by sac   Accompanied by  self         10/5/2023     2:35 PM   Patient Reported Additional Medications   Patient reports taking the following new medications no       Office visit today to discuss red eyes/pain.  Patient reports that approximately 2 weeks ago, he developed acute onset of itching, drainage, and swelling of the right eye.  He actually presented twice to 2 different walk-in care's.  He was initially prescribed ciprofloxacin drops.  He saw minimal improvement in symptoms, and then represented to another clinic, after which he was prescribed both erythromycin ointment and Augmentin.  After he use the erythromycin ointment 2 days ago, he had acute worsening of the right eye.  Currently, he endorses yellow-colored drainage, itching of the right eye, and pain.  He denies any vision changes or floaters in the right eye.  He does not have pain with movement of the right eye.  No headache with nausea.  He denies any known injury or concern for foreign body.  Additionally, he denies any systemic symptoms such as fever or chills.    History of Present Illness       Reason for visit:  Pink eye    He eats 2-3 servings of fruits and vegetables daily.He consumes 0 sweetened beverage(s) daily.He exercises with enough effort to increase his heart rate 60 or more minutes per day.  He exercises with enough effort to increase his heart rate 4 days per week. He is missing 1 dose(s) of medications per week.     Review of Systems   Eyes:  Positive for pain.        Objective    There were no vitals taken for this visit.  There is no height or weight on file to calculate BMI.    Physical Exam  Vitals and nursing note reviewed.   Constitutional:       Appearance: Normal appearance. He is not toxic-appearing.   Eyes:      Conjunctiva/sclera:      Right eye: Right conjunctiva is injected.      Comments: Right eye: Diffuse redness through sclera and conjunctiva.  No appreciated drainage.  Small degree of soft tissue swelling and overlying erythema to  lower and upper eyelid.  Reflexes intact and pupils are equal, round and responsive to light.  Left eye: Normal   Cardiovascular:      Rate and Rhythm: Normal rate.   Pulmonary:      Effort: Pulmonary effort is normal. No respiratory distress.   Neurological:      Mental Status: He is alert.   Psychiatric:         Mood and Affect: Mood normal.         Behavior: Behavior normal.         Thought Content: Thought content normal.

## 2024-01-16 ENCOUNTER — HOSPITAL ENCOUNTER (EMERGENCY)
Facility: CLINIC | Age: 24
Discharge: HOME | End: 2024-01-16
Attending: EMERGENCY MEDICINE | Admitting: EMERGENCY MEDICINE
Payer: COMMERCIAL

## 2024-01-16 VITALS
WEIGHT: 134 LBS | TEMPERATURE: 97.6 F | HEIGHT: 63 IN | OXYGEN SATURATION: 98 % | BODY MASS INDEX: 23.74 KG/M2 | DIASTOLIC BLOOD PRESSURE: 51 MMHG | SYSTOLIC BLOOD PRESSURE: 90 MMHG | RESPIRATION RATE: 24 BRPM | HEART RATE: 87 BPM

## 2024-01-16 DIAGNOSIS — T50.901A DRUG OVERDOSE, ACCIDENTAL OR UNINTENTIONAL, INITIAL ENCOUNTER: ICD-10-CM

## 2024-01-16 DIAGNOSIS — R11.2 NAUSEA AND VOMITING, UNSPECIFIED VOMITING TYPE: ICD-10-CM

## 2024-01-16 DIAGNOSIS — I95.2 HYPOTENSION DUE TO DRUGS: ICD-10-CM

## 2024-01-16 DIAGNOSIS — E86.0 DEHYDRATION: ICD-10-CM

## 2024-01-16 DIAGNOSIS — S37.30XA URETHRAL TRAUMA, INITIAL ENCOUNTER: ICD-10-CM

## 2024-01-16 LAB
ALBUMIN SERPL BCG-MCNC: 4.3 G/DL (ref 3.5–5.2)
ALBUMIN UR-MCNC: 30 MG/DL
ALP SERPL-CCNC: 66 U/L (ref 40–150)
ALT SERPL W P-5'-P-CCNC: 28 U/L (ref 0–70)
AMPHETAMINES UR QL SCN: ABNORMAL
ANION GAP SERPL CALCULATED.3IONS-SCNC: 16 MMOL/L (ref 7–15)
APAP SERPL-MCNC: <5 UG/ML (ref 10–30)
APPEARANCE UR: CLEAR
AST SERPL W P-5'-P-CCNC: ABNORMAL U/L
ATRIAL RATE - MUSE: 66 BPM
BARBITURATES UR QL SCN: ABNORMAL
BASOPHILS # BLD AUTO: 0.1 10E3/UL (ref 0–0.2)
BASOPHILS NFR BLD AUTO: 1 %
BENZODIAZ UR QL SCN: ABNORMAL
BILIRUB SERPL-MCNC: 0.3 MG/DL
BILIRUB UR QL STRIP: NEGATIVE
BUN SERPL-MCNC: 35.9 MG/DL (ref 6–20)
BZE UR QL SCN: ABNORMAL
CALCIUM SERPL-MCNC: 9.1 MG/DL (ref 8.6–10)
CANNABINOIDS UR QL SCN: ABNORMAL
CHLORIDE SERPL-SCNC: 102 MMOL/L (ref 98–107)
COLOR UR AUTO: ABNORMAL
CREAT SERPL-MCNC: 1.25 MG/DL (ref 0.67–1.17)
DEPRECATED HCO3 PLAS-SCNC: 20 MMOL/L (ref 22–29)
DIASTOLIC BLOOD PRESSURE - MUSE: 42 MMHG
EGFRCR SERPLBLD CKD-EPI 2021: 83 ML/MIN/1.73M2
EOSINOPHIL # BLD AUTO: 0.4 10E3/UL (ref 0–0.7)
EOSINOPHIL NFR BLD AUTO: 4 %
ERYTHROCYTE [DISTWIDTH] IN BLOOD BY AUTOMATED COUNT: 11 % (ref 10–15)
ETHANOL SERPL-MCNC: <0.01 G/DL
FENTANYL UR QL: ABNORMAL
GLUCOSE SERPL-MCNC: 137 MG/DL (ref 70–99)
GLUCOSE UR STRIP-MCNC: NEGATIVE MG/DL
GRANULAR CAST: 1 /LPF
HCT VFR BLD AUTO: 43.1 % (ref 40–53)
HGB BLD-MCNC: 14.8 G/DL (ref 13.3–17.7)
HGB UR QL STRIP: NEGATIVE
HOLD SPECIMEN: NORMAL
HYALINE CASTS: 9 /LPF
IMM GRANULOCYTES # BLD: 0 10E3/UL
IMM GRANULOCYTES NFR BLD: 0 %
INTERPRETATION ECG - MUSE: NORMAL
KETONES UR STRIP-MCNC: NEGATIVE MG/DL
LEUKOCYTE ESTERASE UR QL STRIP: NEGATIVE
LYMPHOCYTES # BLD AUTO: 3.5 10E3/UL (ref 0.8–5.3)
LYMPHOCYTES NFR BLD AUTO: 35 %
MAGNESIUM SERPL-MCNC: 2.1 MG/DL (ref 1.7–2.3)
MCH RBC QN AUTO: 32.8 PG (ref 26.5–33)
MCHC RBC AUTO-ENTMCNC: 34.3 G/DL (ref 31.5–36.5)
MCV RBC AUTO: 96 FL (ref 78–100)
MONOCYTES # BLD AUTO: 0.8 10E3/UL (ref 0–1.3)
MONOCYTES NFR BLD AUTO: 8 %
MUCOUS THREADS #/AREA URNS LPF: PRESENT /LPF
NEUTROPHILS # BLD AUTO: 5.2 10E3/UL (ref 1.6–8.3)
NEUTROPHILS NFR BLD AUTO: 52 %
NITRATE UR QL: NEGATIVE
NRBC # BLD AUTO: 0 10E3/UL
NRBC BLD AUTO-RTO: 0 /100
OPIATES UR QL SCN: ABNORMAL
P AXIS - MUSE: 72 DEGREES
PCP QUAL URINE (ROCHE): ABNORMAL
PH UR STRIP: 6.5 [PH] (ref 5–7)
PLATELET # BLD AUTO: 250 10E3/UL (ref 150–450)
POTASSIUM SERPL-SCNC: 3.8 MMOL/L (ref 3.4–5.3)
PR INTERVAL - MUSE: 146 MS
PROT SERPL-MCNC: 7.3 G/DL (ref 6.4–8.3)
QRS DURATION - MUSE: 86 MS
QT - MUSE: 394 MS
QTC - MUSE: 413 MS
R AXIS - MUSE: 84 DEGREES
RBC # BLD AUTO: 4.51 10E6/UL (ref 4.4–5.9)
RBC URINE: 4 /HPF
SALICYLATES SERPL-MCNC: <0.3 MG/DL
SODIUM SERPL-SCNC: 138 MMOL/L (ref 135–145)
SP GR UR STRIP: 1.01 (ref 1–1.03)
SQUAMOUS EPITHELIAL: <1 /HPF
SYSTOLIC BLOOD PRESSURE - MUSE: 79 MMHG
T AXIS - MUSE: 66 DEGREES
TROPONIN T SERPL HS-MCNC: <6 NG/L
TSH SERPL DL<=0.005 MIU/L-ACNC: 2.42 UIU/ML (ref 0.3–4.2)
UROBILINOGEN UR STRIP-MCNC: <2 MG/DL
VENTRICULAR RATE- MUSE: 66 BPM
WBC # BLD AUTO: 10 10E3/UL (ref 4–11)
WBC URINE: 4 /HPF

## 2024-01-16 PROCEDURE — 82077 ASSAY SPEC XCP UR&BREATH IA: CPT | Performed by: EMERGENCY MEDICINE

## 2024-01-16 PROCEDURE — 81001 URINALYSIS AUTO W/SCOPE: CPT | Performed by: EMERGENCY MEDICINE

## 2024-01-16 PROCEDURE — 80179 DRUG ASSAY SALICYLATE: CPT | Performed by: EMERGENCY MEDICINE

## 2024-01-16 PROCEDURE — 258N000003 HC RX IP 258 OP 636: Performed by: EMERGENCY MEDICINE

## 2024-01-16 PROCEDURE — 250N000011 HC RX IP 250 OP 636: Performed by: EMERGENCY MEDICINE

## 2024-01-16 PROCEDURE — 99291 CRITICAL CARE FIRST HOUR: CPT | Mod: 25

## 2024-01-16 PROCEDURE — 84484 ASSAY OF TROPONIN QUANT: CPT | Performed by: EMERGENCY MEDICINE

## 2024-01-16 PROCEDURE — 84460 ALANINE AMINO (ALT) (SGPT): CPT | Performed by: EMERGENCY MEDICINE

## 2024-01-16 PROCEDURE — 80143 DRUG ASSAY ACETAMINOPHEN: CPT | Performed by: EMERGENCY MEDICINE

## 2024-01-16 PROCEDURE — 83735 ASSAY OF MAGNESIUM: CPT | Performed by: EMERGENCY MEDICINE

## 2024-01-16 PROCEDURE — 85025 COMPLETE CBC W/AUTO DIFF WBC: CPT | Performed by: EMERGENCY MEDICINE

## 2024-01-16 PROCEDURE — 84155 ASSAY OF PROTEIN SERUM: CPT | Performed by: EMERGENCY MEDICINE

## 2024-01-16 PROCEDURE — 96361 HYDRATE IV INFUSION ADD-ON: CPT

## 2024-01-16 PROCEDURE — 80307 DRUG TEST PRSMV CHEM ANLYZR: CPT | Performed by: EMERGENCY MEDICINE

## 2024-01-16 PROCEDURE — 36415 COLL VENOUS BLD VENIPUNCTURE: CPT | Performed by: EMERGENCY MEDICINE

## 2024-01-16 PROCEDURE — 84443 ASSAY THYROID STIM HORMONE: CPT | Performed by: EMERGENCY MEDICINE

## 2024-01-16 PROCEDURE — 96376 TX/PRO/DX INJ SAME DRUG ADON: CPT

## 2024-01-16 PROCEDURE — 96374 THER/PROPH/DIAG INJ IV PUSH: CPT

## 2024-01-16 PROCEDURE — 93005 ELECTROCARDIOGRAM TRACING: CPT | Performed by: EMERGENCY MEDICINE

## 2024-01-16 RX ORDER — ONDANSETRON 2 MG/ML
4 INJECTION INTRAMUSCULAR; INTRAVENOUS EVERY 30 MIN PRN
Status: COMPLETED | OUTPATIENT
Start: 2024-01-16 | End: 2024-01-16

## 2024-01-16 RX ORDER — SODIUM CHLORIDE 9 MG/ML
INJECTION, SOLUTION INTRAVENOUS CONTINUOUS
Status: DISCONTINUED | OUTPATIENT
Start: 2024-01-16 | End: 2024-01-16 | Stop reason: HOSPADM

## 2024-01-16 RX ADMIN — ONDANSETRON 4 MG: 2 INJECTION INTRAMUSCULAR; INTRAVENOUS at 04:30

## 2024-01-16 RX ADMIN — SODIUM CHLORIDE 1000 ML: 9 INJECTION, SOLUTION INTRAVENOUS at 02:55

## 2024-01-16 RX ADMIN — ONDANSETRON 4 MG: 2 INJECTION INTRAMUSCULAR; INTRAVENOUS at 02:52

## 2024-01-16 RX ADMIN — ONDANSETRON 4 MG: 2 INJECTION INTRAMUSCULAR; INTRAVENOUS at 03:48

## 2024-01-16 RX ADMIN — SODIUM CHLORIDE 1000 ML: 9 INJECTION, SOLUTION INTRAVENOUS at 05:36

## 2024-01-16 RX ADMIN — SODIUM CHLORIDE 1000 ML: 9 INJECTION, SOLUTION INTRAVENOUS at 02:52

## 2024-01-16 RX ADMIN — SODIUM CHLORIDE: 9 INJECTION, SOLUTION INTRAVENOUS at 03:40

## 2024-01-16 ASSESSMENT — ENCOUNTER SYMPTOMS
NAUSEA: 0
VOMITING: 1

## 2024-01-16 ASSESSMENT — ACTIVITIES OF DAILY LIVING (ADL)
ADLS_ACUITY_SCORE: 35

## 2024-01-16 NOTE — ED NOTES
"Progress Note    The patient was signed out to me by Dr. Anne.    Brief HPI: The patient's parents report that the patient awoke them at 1:30 AM this morning saying that they took CBD Gummies and snorted another substance.  The patient complained of vomiting and \"a lot of pain\"     ED Course as of 01/16/24 1950 Tue Jan 16, 2024   0703 Signout:  23 yo M who huffed poppers and was recreationally putting metal corrine up urethra. He was hypotensive on arrival, nauseous. BP has improved, and symptomatically he feels improved. Poison control has been involved. They recommend a couple more hours of observation from the BP and symptomatic standpoint. He's had 3L NS.   0921 The patient.  He is asymptomatic, plan to ambulate.   0923 I spoke poison control. With normal spo2, and asymptomatic with ambulation he is safe for discharge with sx expecting to improve over the course of the day.       9:23 AM I spoke with Poison Control about the patient's condition and the plan going forward.     Final diagnoses:   Urethral trauma, initial encounter   Hypotension due to drugs   Drug overdose, accidental or unintentional, initial encounter   Dehydration   Nausea and vomiting, unspecified vomiting type         Derek Mauro MD  Emergency Medicine  [facility]       Derek Mauro MD  01/16/24 1950    "

## 2024-01-16 NOTE — ED NOTES
Pt PO challenged with crackers and water. Pt tolerated well. Pt also tolerated ambulation trial well. MD notified.

## 2024-01-16 NOTE — ED NOTES
Ambulated patient down the ozuna. Patient stated that they did not feel dizzy or lightheaded. When we returned to the room the patients BP was 90/46 which showed little to no change.

## 2024-01-16 NOTE — ED NOTES
Report given to LAYLA Ramos.  Patient had large emesis.  MD notified and IV fluids opened to free flow at this time.

## 2024-01-16 NOTE — ED TRIAGE NOTES
Patient presents to the ED, brought in parents, patient woke up parents shortly before arrival states he took gummies and a snorted a drink called double scorpio.  He states he was using a sounding corrine, he did not try and hurt himself.  Patient brought directly back to bed and MD at bedside.  Patient arouses to voice.  Vomited on himself upon arrival.       Triage Assessment (Adult)       Row Name 01/16/24 0256          Triage Assessment    Airway WDL WDL        Respiratory WDL    Respiratory WDL WDL        Skin Circulation/Temperature WDL    Skin Circulation/Temperature WDL X;temperature     Skin Temperature cool        Cardiac WDL    Cardiac WDL WDL        Peripheral/Neurovascular WDL    Peripheral Neurovascular WDL WDL        Cognitive/Neuro/Behavioral WDL    Cognitive/Neuro/Behavioral WDL X;level of consciousness;arousability     Level of Consciousness somnolent     Arousal Level arouses to voice

## 2024-01-16 NOTE — ED PROVIDER NOTES
NAME: Saulo Cleary  AGE: 24 year old male  YOB: 2000  MRN: [id number]  EVALUATION DATE & TIME: 2024  2:39 AM    PCP: System, Provider Not In    ED PROVIDER: Alfred Klein M.D.    Chief Complaint   Patient presents with    Drug Overdose     FINAL IMPRESSION:  1. Urethral trauma, initial encounter    2. Hypotension due to drugs    3. Drug overdose, accidental or unintentional, initial encounter    4. Dehydration    5. Nausea and vomiting, unspecified vomiting type        MEDICAL DECISION MAKIN:37 AM provider called to triage. I met with the patient, obtained history, performed an initial exam, and discussed options and plan for diagnostics and treatment here in the ED.   Patient was clinically assessed and consented to treatment. After assessment, medical decision making and workup were discussed with the patient. The patient was agreeable to plan for testing, workup, and treatment.  Pertinent Labs & Imaging studies reviewed. (See chart for details)  2:47 AM I spoke with Poison Control   2:51 AM I updated the patient and their parents with the update from poison control   3:35 AM I rechecked the patient who denies any pain right now    3:52 AM nurse reports the patient had an episode of emesis and vomited about 1 L.  Third liter of fluids running now.  Nurse notes that the patient urinated and saw no gross hematuria in the sample  .4:11 AM I spoke with [facility] regarding the possibility of performing a retrograde urethrogram on patient.  4:19 AM patient discussed with Dr. Cedeño from  [facility].  Given the 4 red blood cells in the urine low probability of severe urethral injury.  Patient is voiding well and he does not recommend retrograde urethrogram at this time.  Rather monitoring for any voiding issues subsequently and treating other medical issues with follow-up to urology in 3 to 4 weeks for recheck of any urethral stricture development.  4:27 AM patient and  "family updated and will continue monitoring continue fluid resuscitation.  Patient's nausea returned and will try a another dose of Zofran as well as add Phenergan.  4:48 a.m. Poison control called back and recommended continuing fluids and monitoring for recovery from nitrites.  5:08 AM Patient signed out pending re-assessment.       Medical Decision Making  Obtained supplemental history:Supplemental history obtained?: Family Member/Significant Other  Reviewed external records: External records reviewed?: No  Care impacted by chronic illness:N/A  Care significantly affected by social determinants of health:N/A  Did you consider but not order tests?: In addition to work-up documented, I considered the following work up:   Did you interpret images independently?: Independent interpretation of ECG and images noted in documentation, when applicable.  Consultation discussion with other provider:Did you involve another provider (consultant, , pharmacy, etc.)?: I discussed the care with another health care provider, see documentation for details.  Admission considered. Patient was signed out to the oncoming physician, disposition pending.    Saulo Cleary is a 24 year old male who presents with drug overdose.   Differential diagnosis includes but not limited to nitrate overdose, hypotension, vagal near syncope, marijuana use, dehydration, bradycardia, urethral injury.  Patient is a 24-year-old male otherwise healthy who had eaten some marijuana Gummies as well as he had snorted some \"Double Scorpio\" which is a unverified sexual enhancement drug.  It does contain nitrates and would cause a low blood pressure with vasodilation.  I did discuss this with poison control who recommended supportive care including fluids and possibly pressors if not improving with fluids.  Additionally patient had vomited and likely had vagus nerve stimulus from the vomiting.  Patient also had been using a sounding corrine in his urethra at the " time of use of this drug in order to promote sexual stimulation.  He reports he has done this before and the sonographic was still however he had a sharp sudden pain when he had tried to push it further and and then felt sick to his stomach.  He reports there was blood in possibly some feces however he is not sure if this was from his rectum or possibly from the urethra.  It be unusual for him to perforate the urethra into the rectum and I feel likely this was a result of him hitting the prostate causing significant pain.  Patient does not report any groin, perineal, or rectal pain.  He was pale and diaphoretic when he arrived and fluids were given with improvement in blood pressure and heart rate.  He did require a second dose of antiemetic.  Labs did not show any coingestions and did show slight elevation in creatinine which would explain some of the hypotension in addition to the vasodilation and vagus nerve stimulation.  Patient was able to provide a urine sample and only had 4 red blood cells in it which would be unlikely deficits, with a urethral injury.  I did speak with radiology about possibly getting retrograde urethrogram however they are not in house.  Will discuss with urology first and then consider x-ray or retrograde urethrogram.  Case discussed with urology and at this time given the low amount of blood in the urine  Dr. Cedeño would recommended monitoring for any urinary retention while treating other symptoms from the overdose.  They would recommend follow-up in about 4 weeks with urology to check for any strictures however if he does develop any retention a suprapubic catheter may need to be placed.  I discussed recommendations with family and at this time they are comfortable with monitoring patient in the ER for clearance of the nitrites and improvement in nausea, vomiting, and hypotension from the nitrates.  Patient was signed out pending observation and improvement in blood pressure and  symptoms with possible clearance home versus need for admission and further monitoring.    Critical Care     Performed by: Dr Isiah Klein  Authorized by: Dr Isiah Klein  Total critical care time: 60 minutes  Critical care was necessary to treat or prevent imminent or life-threatening deterioration of the following conditions: Drug overdose, hypotension, bradycardia, prostate injury, nausea and vomiting, dehydration.  Critical care was time spent personally by me on the following activities: development of treatment plan with patient or surrogate, discussions with consultants, examination of patient, evaluation of patient's response to treatment, obtaining history from patient or surrogate, ordering and performing treatments and interventions, ordering and review of laboratory studies, ordering and review of radiographic studies, re-evaluation of patient's condition and monitoring for potential decompensation.  Critical care time was exclusive of separately billable procedures and treating other patients.     MEDICATIONS GIVEN IN THE EMERGENCY:  Medications   sodium chloride 0.9 % infusion ( Intravenous $New Bag 1/16/24 0340)   promethazine (PHENERGAN) 12.5 mg in sodium chloride 0.9 % 55 mL intermittent infusion (has no administration in time range)   sodium chloride 0.9% BOLUS 1,000 mL (1,000 mLs Intravenous $New Bag 1/16/24 0252)   ondansetron (ZOFRAN) injection 4 mg (4 mg Intravenous $Given 1/16/24 0430)   sodium chloride 0.9% BOLUS 1,000 mL (1,000 mLs Intravenous $New Bag 1/16/24 0255)     NEW PRESCRIPTIONS STARTED AT TODAY'S ER VISIT:  New Prescriptions    No medications on file     =================================================================    HPI    Patient information was obtained from: Patient and patient's parents    Use of : N/A       Saulo Cleary is a 24 year old male with no contributory medical history who presents with ingestion    HPI limited due to patient's acuity of  "condition    The patient's parents report that the patient awoke them at 1:30 AM this morning saying that they took CBD Gummies and snorted another substance.  The patient complained of vomiting and \"a lot of pain\".  The patient's mother reports that the patient was sounding and was not intending to hurt himself.  Patient endorsed multiple episodes of vomiting and is pale in color.    Patient reports that they were sounding tonight and was pushing a metal corrine up their urethra.  Patient reports they were pushing pretty hard and felt a lot of pain and thought they saw feces coming out of their urethra.  The patient reports snorting this substance \"a couple times\" before but has \"never\" been sick like this while using it.  The patient denies any nausea or pain right now.  The patient denies any suicidal ideation or intent to harm. The patient denies any alcohol use tonight or any other ingestions.   The patient is somnolent, but arouses to voice and answers questions.    The patient's mother brought in the bottle of the substance the patient snorted.  The bottle reads: Double Scorpio tape .  The the bottle contains Isobutyl Nitrite, N-butyl nitrite, and Tert-Butyl Nitirite and states \"not for human consumption avoid contact with skin do not use as an inhalant\".    Upon recheck, the patient reported no pain when they were initially putting the metal corrine into their urethra. Then when they pushed harder, they felt sharp pain and saw blood coming from their urethra.     REVIEW OF SYSTEMS   Review of Systems   Unable to perform ROS: Acuity of condition   Gastrointestinal:  Positive for vomiting. Negative for nausea.      PAST MEDICAL HISTORY:  Past Medical History:   Diagnosis Date    ADHD (attention deficit hyperactivity disorder)     Allergic rhinitis, cause unspecified     Anxiety state, unspecified     Depression     Insomnia     Overweight(278.02)     Sialoadenitis     Unspecified asthma(493.90)        PAST " SURGICAL HISTORY:  Past Surgical History:   Procedure Laterality Date    EXAM UNDER ANESTHESIA ANUS N/A 1/3/2023    Procedure: EXAM UNDER ANESTHESIA, ANUS, anal skin tag removal;  Surgeon: Jevon Velarde MD;  Location: [facility] OR    REMOVE TAG ANAL N/A 1/3/2023    Procedure: REMOVAL, SKIN TAG, ANUS;  Surgeon: Jevon Velarde MD;  Location: [facility] OR    wisdom teeth         CURRENT MEDICATIONS:      Current Facility-Administered Medications:     promethazine (PHENERGAN) 12.5 mg in sodium chloride 0.9 % 55 mL intermittent infusion, 12.5 mg, Intravenous, Once, Alfred Klein MD    sodium chloride 0.9 % infusion, , Intravenous, Continuous, Alfred Klein MD, Last Rate: 125 mL/hr at 01/16/24 0340, New Bag at 01/16/24 0340    Current Outpatient Medications:     albuterol (VENTOLIN HFA) 90 mcg/actuation inhaler, [ALBUTEROL (VENTOLIN HFA) 90 MCG/ACTUATION INHALER] INHALE TWO PUFFS BY MOUTH EVERY 4 HOURS AS NEEDED FOR WHEEZING OR COUGH, Disp: 1 Inhaler, Rfl: 4    augmented betamethasone dipropionate (DIPROLENE-AF) 0.05 % external ointment, Apply topically 2 times daily, Disp: 50 g, Rfl: 0    cloNIDine HCL (CATAPRES) 0.1 MG tablet, Take 0.1 mg by mouth as needed, Disp: , Rfl:     desvenlafaxine (PRISTIQ) 50 MG 24 hr tablet, Take 50 mg by mouth every morning Take with 25 mg tablet for total dose of 75 mg, Disp: , Rfl:     desvenlafaxine succinate (PRISTIQ) 25 MG 24 hr tablet, Take 25 mg by mouth every morning Take with 50 mg tablet for total dose of 75 mg, Disp: , Rfl:     fluticasone (FLOVENT HFA) 44 mcg/actuation inhaler, [FLUTICASONE (FLOVENT HFA) 44 MCG/ACTUATION INHALER] Inhale 2 puffs 2 (two) times a day. (Patient taking differently: Inhale 2 puffs into the lungs as needed), Disp: 1 Inhaler, Rfl: 4    ALLERGIES:  No Known Allergies    FAMILY HISTORY:  Family History   Adopted: Yes   Problem Relation Age of Onset    Unknown/Adopted Mother     Unknown/Adopted Father     Anesthesia Reaction No  family hx of     Thrombosis No family hx of      SOCIAL HISTORY:   Social History     Socioeconomic History    Marital status: Single    Number of children: 0   Occupational History    Occupation: student   Tobacco Use    Smoking status: Never     Passive exposure: Never    Smokeless tobacco: Never   Vaping Use    Vaping Use: Never used   Substance and Sexual Activity    Alcohol use: Never    Drug use: Never    Sexual activity: Never   Social History Narrative    Parents are .  Ike and sister Monet stay at both parents' homes.     Social Determinants of Health     Financial Resource Strain: Low Risk  (10/5/2023)    Financial Resource Strain     Within the past 12 months, have you or your family members you live with been unable to get utilities (heat, electricity) when it was really needed?: No   Food Insecurity: Low Risk  (10/5/2023)    Food Insecurity     Within the past 12 months, did you worry that your food would run out before you got money to buy more?: No     Within the past 12 months, did the food you bought just not last and you didn t have money to get more?: No   Transportation Needs: Low Risk  (10/5/2023)    Transportation Needs     Within the past 12 months, has lack of transportation kept you from medical appointments, getting your medicines, non-medical meetings or appointments, work, or from getting things that you need?: No   Interpersonal Safety: Low Risk  (10/5/2023)    Interpersonal Safety     Do you feel physically and emotionally safe where you currently live?: Yes     Within the past 12 months, have you been hit, slapped, kicked or otherwise physically hurt by someone?: No     Within the past 12 months, have you been humiliated or emotionally abused in other ways by your partner or ex-partner?: No   Housing Stability: Low Risk  (10/5/2023)    Housing Stability     Do you have housing? : Yes     Are you worried about losing your housing?: No     PHYSICAL EXAM:    Vitals: BP 90/51   " Pulse 70   Temp 97.6  F (36.4  C) (Oral)   Resp 15   Ht 1.6 m (5' 3\")   Wt 60.8 kg (134 lb)   SpO2 100%   BMI 23.74 kg/m     Physical Exam  Vitals and nursing note reviewed. Exam conducted with a chaperone present.   Constitutional:       General: He is not in acute distress.     Appearance: Normal appearance. He is normal weight. He is ill-appearing. He is not toxic-appearing or diaphoretic.   HENT:      Head: Normocephalic and atraumatic.   Eyes:      Extraocular Movements: Extraocular movements intact.      Pupils: Pupils are equal, round, and reactive to light.   Cardiovascular:      Rate and Rhythm: Regular rhythm. Bradycardia present.      Pulses: Normal pulses.      Heart sounds: Normal heart sounds.   Pulmonary:      Effort: Pulmonary effort is normal. No respiratory distress.      Breath sounds: Normal breath sounds. No stridor. No rhonchi or rales.   Abdominal:      Palpations: Abdomen is soft.      Tenderness: There is no abdominal tenderness. There is no right CVA tenderness, left CVA tenderness or guarding.   Genitourinary:     Penis: Circumcised. No phimosis, paraphimosis, hypospadias, erythema, tenderness, discharge or swelling.       Testes: Normal. Cremasteric reflex is present.         Right: Tenderness or swelling not present.         Left: Tenderness or swelling not present.      Epididymis:      Right: No tenderness.      Left: No tenderness.      Prostate: Not tender.   Musculoskeletal:         General: No signs of injury. Normal range of motion.      Cervical back: Normal range of motion.   Skin:     Coloration: Skin is pale. Skin is not jaundiced.      Findings: No erythema.   Neurological:      Mental Status: He is alert.      Cranial Nerves: No cranial nerve deficit.      Motor: No weakness.   Psychiatric:         Thought Content: Thought content does not include suicidal ideation.         Cognition and Memory: Cognition is not impaired.        LAB:  All pertinent labs reviewed and " interpreted.  Labs Ordered and Resulted from Time of ED Arrival to Time of ED Departure   COMPREHENSIVE METABOLIC PANEL - Abnormal       Result Value    Sodium 138      Potassium 3.8      Carbon Dioxide (CO2) 20 (*)     Anion Gap 16 (*)     Urea Nitrogen 35.9 (*)     Creatinine 1.25 (*)     GFR Estimate 83      Calcium 9.1      Chloride 102      Glucose 137 (*)     Alkaline Phosphatase 66      AST        ALT 28      Protein Total 7.3      Albumin 4.3      Bilirubin Total 0.3     ROUTINE UA WITH MICROSCOPIC REFLEX TO CULTURE - Abnormal    Color Urine Light Yellow      Appearance Urine Clear      Glucose Urine Negative      Bilirubin Urine Negative      Ketones Urine Negative      Specific Gravity Urine 1.014      Blood Urine Negative      pH Urine 6.5      Protein Albumin Urine 30 (*)     Urobilinogen Urine <2.0      Nitrite Urine Negative      Leukocyte Esterase Urine Negative      Mucus Urine Present (*)     RBC Urine 4 (*)     WBC Urine 4      Squamous Epithelials Urine <1      Hyaline Casts Urine 9 (*)     Granular Casts Urine 1 (*)    ACETAMINOPHEN LEVEL - Abnormal    Acetaminophen <5.0 (*)    URINE DRUG SCREEN PANEL - Abnormal    Amphetamines Urine Screen Negative      Barbituates Urine Screen Negative      Benzodiazepine Urine Screen Negative      Cannabinoids Urine Screen Positive (*)     Cocaine Urine Screen Negative      Fentanyl Qual Urine Screen Negative      Opiates Urine Screen Negative      PCP Urine Screen Negative     TROPONIN T, HIGH SENSITIVITY - Normal    Troponin T, High Sensitivity <6     MAGNESIUM - Normal    Magnesium 2.1     TSH WITH FREE T4 REFLEX - Normal    TSH 2.42     SALICYLATE LEVEL - Normal    Salicylate <0.3     ETHYL ALCOHOL LEVEL - Normal    Alcohol ethyl <0.01     CBC WITH PLATELETS AND DIFFERENTIAL    WBC Count 10.0      RBC Count 4.51      Hemoglobin 14.8      Hematocrit 43.1      MCV 96      MCH 32.8      MCHC 34.3      RDW 11.0      Platelet Count 250      % Neutrophils 52       % Lymphocytes 35      % Monocytes 8      % Eosinophils 4      % Basophils 1      % Immature Granulocytes 0      NRBCs per 100 WBC 0      Absolute Neutrophils 5.2      Absolute Lymphocytes 3.5      Absolute Monocytes 0.8      Absolute Eosinophils 0.4      Absolute Basophils 0.1      Absolute Immature Granulocytes 0.0      Absolute NRBCs 0.0       RADIOLOGY:  No orders to display     EKG:     Performed at: 2:52 AM on January 16, 2024  Impression: Sinus rhythm, early repolarization otherwise normal EKG with no signs of acute ST elevation ischemia, no toxidrome or acute irregular rhythm.  Rate: 66 bpm  Rhythm: Sinus rhythm  QRS Interval: 86 ms  QTc Interval: 413 ms  Comparison: No old EKG for comparison    I independently reviewed and interpreted the patient's EKG.    PROCEDURES:   Procedures     I, Nel Dent, am serving as a scribe to document services personally performed by Dr. Alfred Klein  based on my observation and the provider's statements to me. I, Alfred Klein MD attest that Nel Dent is acting in a scribe capacity, has observed my performance of the services and has documented them in accordance with my direction.    Alfred Klein M.D.  Emergency Medicine  [facility] EMERGENCY ROOM  [address] [id number]  [phone]  Dept: [phone]       Alfred Klein MD  01/16/24 7577

## 2024-01-16 NOTE — DISCHARGE INSTRUCTIONS
"Your blood pressure should only continue to improve over the course of the day.  If you feel little bit lightheaded make sure that you lay down.  Try not to overly exert yourself today.  Make sure that you remain hydrated, and drink electrolytes.  Return to a full diet is also recommended if possible.    Recommend avoiding any further use of metal corrine or instruments into the urethra as this could cause further trauma.  Recommend allowing urethra to heal and follow-up with urology in a few weeks is recommended to recheck any residual urethral injury or scar tissue.      As far as any drug use recommend avoiding any further substances such as the \"Double Scorpio\" or Nitrites which can cause significant blood pressure drops and injury to your body.  Continue good hydration and follow-up with your regular doctor for recheck of recovery.  "

## 2024-06-11 ENCOUNTER — OFFICE VISIT (OUTPATIENT)
Dept: FAMILY MEDICINE | Facility: CLINIC | Age: 24
End: 2024-06-11
Payer: COMMERCIAL

## 2024-06-11 VITALS
HEART RATE: 69 BPM | HEIGHT: 63 IN | RESPIRATION RATE: 16 BRPM | TEMPERATURE: 98 F | OXYGEN SATURATION: 97 % | BODY MASS INDEX: 24.5 KG/M2 | SYSTOLIC BLOOD PRESSURE: 111 MMHG | DIASTOLIC BLOOD PRESSURE: 67 MMHG | WEIGHT: 138.3 LBS

## 2024-06-11 DIAGNOSIS — N50.89 SCROTAL SWELLING: ICD-10-CM

## 2024-06-11 DIAGNOSIS — Z11.59 NEED FOR HEPATITIS C SCREENING TEST: ICD-10-CM

## 2024-06-11 DIAGNOSIS — Z11.4 SCREENING FOR HIV (HUMAN IMMUNODEFICIENCY VIRUS): ICD-10-CM

## 2024-06-11 DIAGNOSIS — F33.41 RECURRENT MAJOR DEPRESSIVE DISORDER, IN PARTIAL REMISSION (H): ICD-10-CM

## 2024-06-11 DIAGNOSIS — R61 NIGHT SWEATS: Primary | ICD-10-CM

## 2024-06-11 DIAGNOSIS — Z11.3 SCREENING EXAMINATION FOR STI: ICD-10-CM

## 2024-06-11 LAB
ALBUMIN SERPL BCG-MCNC: 4.5 G/DL (ref 3.5–5.2)
ALBUMIN UR-MCNC: NEGATIVE MG/DL
ALP SERPL-CCNC: 78 U/L (ref 40–150)
ALT SERPL W P-5'-P-CCNC: 29 U/L (ref 0–70)
ANION GAP SERPL CALCULATED.3IONS-SCNC: 10 MMOL/L (ref 7–15)
APPEARANCE UR: CLEAR
AST SERPL W P-5'-P-CCNC: 33 U/L (ref 0–45)
BILIRUB SERPL-MCNC: 0.4 MG/DL
BILIRUB UR QL STRIP: NEGATIVE
BUN SERPL-MCNC: 19.5 MG/DL (ref 6–20)
CALCIUM SERPL-MCNC: 9.6 MG/DL (ref 8.6–10)
CHLORIDE SERPL-SCNC: 102 MMOL/L (ref 98–107)
COLOR UR AUTO: YELLOW
CREAT SERPL-MCNC: 0.93 MG/DL (ref 0.67–1.17)
CRP SERPL-MCNC: <3 MG/L
DEPRECATED HCO3 PLAS-SCNC: 28 MMOL/L (ref 22–29)
EGFRCR SERPLBLD CKD-EPI 2021: >90 ML/MIN/1.73M2
GLUCOSE SERPL-MCNC: 86 MG/DL (ref 70–99)
GLUCOSE UR STRIP-MCNC: NEGATIVE MG/DL
HCV AB SERPL QL IA: NONREACTIVE
HGB UR QL STRIP: NEGATIVE
HIV 1+2 AB+HIV1 P24 AG SERPL QL IA: NONREACTIVE
KETONES UR STRIP-MCNC: NEGATIVE MG/DL
LEUKOCYTE ESTERASE UR QL STRIP: NEGATIVE
NITRATE UR QL: NEGATIVE
PH UR STRIP: 7 [PH] (ref 5–8)
POTASSIUM SERPL-SCNC: 4.1 MMOL/L (ref 3.4–5.3)
PROT SERPL-MCNC: 7 G/DL (ref 6.4–8.3)
SODIUM SERPL-SCNC: 140 MMOL/L (ref 135–145)
SP GR UR STRIP: 1.02 (ref 1–1.03)
T PALLIDUM AB SER QL: NONREACTIVE
TSH SERPL DL<=0.005 MIU/L-ACNC: 2.05 UIU/ML (ref 0.3–4.2)
UROBILINOGEN UR STRIP-ACNC: 0.2 E.U./DL

## 2024-06-11 PROCEDURE — 86803 HEPATITIS C AB TEST: CPT | Performed by: NURSE PRACTITIONER

## 2024-06-11 PROCEDURE — 86140 C-REACTIVE PROTEIN: CPT | Performed by: NURSE PRACTITIONER

## 2024-06-11 PROCEDURE — 86780 TREPONEMA PALLIDUM: CPT | Performed by: NURSE PRACTITIONER

## 2024-06-11 PROCEDURE — 80053 COMPREHEN METABOLIC PANEL: CPT | Performed by: NURSE PRACTITIONER

## 2024-06-11 PROCEDURE — G2211 COMPLEX E/M VISIT ADD ON: HCPCS | Performed by: NURSE PRACTITIONER

## 2024-06-11 PROCEDURE — 86481 TB AG RESPONSE T-CELL SUSP: CPT | Performed by: NURSE PRACTITIONER

## 2024-06-11 PROCEDURE — 87491 CHLMYD TRACH DNA AMP PROBE: CPT | Performed by: NURSE PRACTITIONER

## 2024-06-11 PROCEDURE — 84443 ASSAY THYROID STIM HORMONE: CPT | Performed by: NURSE PRACTITIONER

## 2024-06-11 PROCEDURE — 99214 OFFICE O/P EST MOD 30 MIN: CPT | Performed by: NURSE PRACTITIONER

## 2024-06-11 PROCEDURE — 36415 COLL VENOUS BLD VENIPUNCTURE: CPT | Performed by: NURSE PRACTITIONER

## 2024-06-11 PROCEDURE — 87389 HIV-1 AG W/HIV-1&-2 AB AG IA: CPT | Performed by: NURSE PRACTITIONER

## 2024-06-11 PROCEDURE — 87591 N.GONORRHOEAE DNA AMP PROB: CPT | Performed by: NURSE PRACTITIONER

## 2024-06-11 PROCEDURE — 81003 URINALYSIS AUTO W/O SCOPE: CPT | Performed by: NURSE PRACTITIONER

## 2024-06-11 RX ORDER — DEXTROAMPHETAMINE SACCHARATE, AMPHETAMINE ASPARTATE MONOHYDRATE, DEXTROAMPHETAMINE SULFATE AND AMPHETAMINE SULFATE 2.5; 2.5; 2.5; 2.5 MG/1; MG/1; MG/1; MG/1
10 CAPSULE, EXTENDED RELEASE ORAL EVERY MORNING
COMMUNITY
Start: 2024-05-13

## 2024-06-11 RX ORDER — DEXTROMETHORPHAN HYDROBROMIDE, BUPROPION HYDROCHLORIDE 105; 45 MG/1; MG/1
1 TABLET, MULTILAYER, EXTENDED RELEASE ORAL
COMMUNITY
Start: 2024-05-24

## 2024-06-11 ASSESSMENT — ASTHMA QUESTIONNAIRES
QUESTION_5 LAST FOUR WEEKS HOW WOULD YOU RATE YOUR ASTHMA CONTROL: COMPLETELY CONTROLLED
ACT_TOTALSCORE: 25
QUESTION_2 LAST FOUR WEEKS HOW OFTEN HAVE YOU HAD SHORTNESS OF BREATH: NOT AT ALL
QUESTION_4 LAST FOUR WEEKS HOW OFTEN HAVE YOU USED YOUR RESCUE INHALER OR NEBULIZER MEDICATION (SUCH AS ALBUTEROL): NOT AT ALL
QUESTION_1 LAST FOUR WEEKS HOW MUCH OF THE TIME DID YOUR ASTHMA KEEP YOU FROM GETTING AS MUCH DONE AT WORK, SCHOOL OR AT HOME: NONE OF THE TIME
QUESTION_3 LAST FOUR WEEKS HOW OFTEN DID YOUR ASTHMA SYMPTOMS (WHEEZING, COUGHING, SHORTNESS OF BREATH, CHEST TIGHTNESS OR PAIN) WAKE YOU UP AT NIGHT OR EARLIER THAN USUAL IN THE MORNING: NOT AT ALL
ACT_TOTALSCORE: 25

## 2024-06-11 ASSESSMENT — ENCOUNTER SYMPTOMS: WHEEZING: 1

## 2024-06-11 ASSESSMENT — COLUMBIA-SUICIDE SEVERITY RATING SCALE - C-SSRS
1. WITHIN THE PAST MONTH, HAVE YOU WISHED YOU WERE DEAD OR WISHED YOU COULD GO TO SLEEP AND NOT WAKE UP?: YES
6. HAVE YOU EVER DONE ANYTHING, STARTED TO DO ANYTHING, OR PREPARED TO DO ANYTHING TO END YOUR LIFE?: NO
2. IN THE PAST MONTH, HAVE YOU ACTUALLY HAD ANY THOUGHTS OF KILLING YOURSELF?: NO

## 2024-06-11 ASSESSMENT — PATIENT HEALTH QUESTIONNAIRE - PHQ9
SUM OF ALL RESPONSES TO PHQ QUESTIONS 1-9: 8
SUM OF ALL RESPONSES TO PHQ QUESTIONS 1-9: 8
10. IF YOU CHECKED OFF ANY PROBLEMS, HOW DIFFICULT HAVE THESE PROBLEMS MADE IT FOR YOU TO DO YOUR WORK, TAKE CARE OF THINGS AT HOME, OR GET ALONG WITH OTHER PEOPLE: SOMEWHAT DIFFICULT

## 2024-06-11 NOTE — PROGRESS NOTES
Assessment & Plan     1. Night sweats  Unclear cause of night sweats.  On review patient's medications both can lead to excessive sweating however patient reports that the sweating started prior to initiation of these medications.  I will obtain lab work to look into possible causes of these night sweats.  Will plan follow-up based on results.    - Quantiferon-TB Gold Plus; Future  - TSH with free T4 reflex; Future  - CRP inflammation; Future  - Comprehensive metabolic panel; Future  - UA Macroscopic with reflex to Microscopic and Culture - Clinic Collect  - Quantiferon-TB Gold Plus  - TSH with free T4 reflex  - CRP inflammation  - Comprehensive metabolic panel    2. Scrotal swelling  Right-sided scrotal swelling.  It does seem like possible hydrocele.  I think we should obtain STI testing as well as UA.  I will also obtain a testicular ultrasound to better ascertain what this is.  - NEISSERIA GONORRHOEA PCR; Future  - CHLAMYDIA TRACHOMATIS PCR; Future  - UA Macroscopic with reflex to Microscopic and Culture - Clinic Collect  - US Testicular & Scrotum w Doppler Ltd; Future  - NEISSERIA GONORRHOEA PCR  - CHLAMYDIA TRACHOMATIS PCR    3. Recurrent major depressive disorder, in partial remission (H24)  Does endorse passive suicidal ideation.  Patient follows with psychiatry.  Patient denies active plan to hurt self or others.  Patient taking medications as prescribed.  Information given regarding suicide hotline and recommendation to follow-up in ER if having any active suicidal ideation.    4. Screening examination for STI  - Treponema Abs w Reflex to RPR and Titer; Future  - NEISSERIA GONORRHOEA PCR; Future  - CHLAMYDIA TRACHOMATIS PCR; Future  - Treponema Abs w Reflex to RPR and Titer  - NEISSERIA GONORRHOEA PCR  - CHLAMYDIA TRACHOMATIS PCR    5. Screening for HIV (human immunodeficiency virus)  Screening  - HIV Antigen Antibody Combo; Future  - HIV Antigen Antibody Combo    6. Need for hepatitis C screening  test  - Hepatitis C Screen Reflex to HCV RNA Quant and Genotype; Future  - Hepatitis C Screen Reflex to HCV RNA Quant and Genotype           Depression Screening Follow Up              6/11/2024    10:11 AM   C-SS[facility] ([facility] [place])   Within the last month, have you wished you were dead or wished you could go to sleep and not wake up? Yes   Within the last month, have you had any actual thoughts of killing yourself? No   Within the last month, have you ever done anything, started to do anything, or prepared to do anything to end your life? No         Follow Up Actions Taken  Crisis resource information provided in the After Visit Summary  Referred patient back to mental health provider    Discussed the following ways the patient can remain in a safe environment:  be around others        Subjective   Ike is a 24 year old, presenting for the following health issues:  Allergies (Bad allergies that are making him sweat at night. Not able to sleep and waking up multiple times. )      6/11/2024     9:50 AM   Additional Questions   Roomed by CR   Accompanied by N/A     History of Present Illness       Reason for visit:  Execessive sweating at night and I think possibly epiditimits    He eats 0-1 servings of fruits and vegetables daily.He consumes 0 sweetened beverage(s) daily.He exercises with enough effort to increase his heart rate 60 or more minutes per day.  He exercises with enough effort to increase his heart rate 7 days per week. He is missing 1 dose(s) of medications per week.   Patient denies that he is worried it is related to allergies when asked.  Patient waking up at night sweating-bed is wet after sweating. Night sweats have been going on for months. Happening most nights (5 nights a week). Room temperature is normal-has A/C at home. No other symptoms. No cough, chest pain. Most nights has to switch sides of the bed or take a shower.     Patient reports night sweats started before starting auvelity and  "adderall (started by psych about 2 months ago).     Epididymitis concerns-tension and aching. No pain with urination. NO discharge. Scrotum appears more swollen on the right side than the left.    No TB exposure.     Patient is sexually active-denies history.     Has not done double scorpio since January. Does marijuana gummy about once a week.         5/26/2022    11:30 AM 9/7/2023    11:37 AM 6/11/2024     9:45 AM   PHQ   PHQ-9 Total Score 4 7 8   Q9: Thoughts of better off dead/self-harm past 2 weeks Not at all Not at all Several days   F/U: Thoughts of suicide or self-harm   No   F/U: Safety concerns   No                        Objective    /67 (BP Location: Right arm, Patient Position: Sitting, Cuff Size: Adult Regular)   Pulse 69   Temp 98  F (36.7  C) (Oral)   Resp 16   Ht 1.594 m (5' 2.75\")   Wt 62.7 kg (138 lb 4.8 oz)   SpO2 97%   BMI 24.69 kg/m    Body mass index is 24.69 kg/m .  Physical Exam  Constitutional:       Appearance: Normal appearance.   Cardiovascular:      Rate and Rhythm: Normal rate and regular rhythm.   Pulmonary:      Effort: Pulmonary effort is normal.      Breath sounds: Normal breath sounds.   Neurological:      General: No focal deficit present.      Mental Status: He is alert and oriented to person, place, and time.   Psychiatric:         Mood and Affect: Mood normal.         Behavior: Behavior normal.                    Signed Electronically by: ROQUE REYNOLDS CNP    "

## 2024-06-12 LAB
C TRACH DNA SPEC QL NAA+PROBE: NEGATIVE
GAMMA INTERFERON BACKGROUND BLD IA-ACNC: 0.02 IU/ML
M TB IFN-G BLD-IMP: NEGATIVE
M TB IFN-G CD4+ BCKGRND COR BLD-ACNC: 9.98 IU/ML
MITOGEN IGNF BCKGRD COR BLD-ACNC: 0.01 IU/ML
MITOGEN IGNF BCKGRD COR BLD-ACNC: 0.01 IU/ML
N GONORRHOEA DNA SPEC QL NAA+PROBE: NEGATIVE
QUANTIFERON MITOGEN: 10 IU/ML
QUANTIFERON NIL TUBE: 0.02 IU/ML
QUANTIFERON TB1 TUBE: 0.03 IU/ML
QUANTIFERON TB2 TUBE: 0.03

## 2024-08-21 ENCOUNTER — HOSPITAL ENCOUNTER (EMERGENCY)
Facility: CLINIC | Age: 24
Discharge: HOME | End: 2024-08-21
Attending: EMERGENCY MEDICINE | Admitting: EMERGENCY MEDICINE
Payer: COMMERCIAL

## 2024-08-21 VITALS
WEIGHT: 134 LBS | RESPIRATION RATE: 18 BRPM | BODY MASS INDEX: 24.66 KG/M2 | DIASTOLIC BLOOD PRESSURE: 75 MMHG | HEIGHT: 62 IN | TEMPERATURE: 97.7 F | OXYGEN SATURATION: 98 % | SYSTOLIC BLOOD PRESSURE: 126 MMHG | HEART RATE: 99 BPM

## 2024-08-21 DIAGNOSIS — N50.811 PAIN IN BOTH TESTICLES: ICD-10-CM

## 2024-08-21 DIAGNOSIS — N50.812 PAIN IN BOTH TESTICLES: ICD-10-CM

## 2024-08-21 PROCEDURE — 99282 EMERGENCY DEPT VISIT SF MDM: CPT

## 2024-08-21 ASSESSMENT — COLUMBIA-SUICIDE SEVERITY RATING SCALE - C-SSRS
6. HAVE YOU EVER DONE ANYTHING, STARTED TO DO ANYTHING, OR PREPARED TO DO ANYTHING TO END YOUR LIFE?: NO
2. HAVE YOU ACTUALLY HAD ANY THOUGHTS OF KILLING YOURSELF IN THE PAST MONTH?: NO
1. IN THE PAST MONTH, HAVE YOU WISHED YOU WERE DEAD OR WISHED YOU COULD GO TO SLEEP AND NOT WAKE UP?: NO

## 2024-08-21 ASSESSMENT — ACTIVITIES OF DAILY LIVING (ADL): ADLS_ACUITY_SCORE: 33

## 2024-08-21 NOTE — ED TRIAGE NOTES
"Patient ambulatory into triage concerned about testicular \"twisting\" that happened when patient had testes between legs for a prolonged amount of time. Denies pain and swelling now.      Triage Assessment (Adult)       Row Name 08/21/24 0247          Triage Assessment    Airway WDL WDL        Respiratory WDL    Respiratory WDL WDL        Skin Circulation/Temperature WDL    Skin Circulation/Temperature WDL WDL        Cardiac WDL    Cardiac WDL WDL        Peripheral/Neurovascular WDL    Peripheral Neurovascular WDL WDL        Cognitive/Neuro/Behavioral WDL    Cognitive/Neuro/Behavioral WDL WDL                     "

## 2024-08-21 NOTE — ED PROVIDER NOTES
EMERGENCY DEPARTMENT ENCOUNTER      NAME: Saulo Cleary  AGE: 24 year old male  YOB: 2000  MRN: [id number]  EVALUATION DATE & TIME: 8/21/2024  2:50 AM    PCP: System, Provider Not In    ED PROVIDER: Elliott Johnson M.D.      Chief Complaint   Patient presents with    Testicular/scrotal Pain         FINAL IMPRESSION:  1. Pain in both testicles          ED COURSE & MEDICAL DECISION MAKING:    Pertinent Labs & Imaging studies reviewed. (See chart for details)  24 year old male presents to the Emergency Department for evaluation of testicular pain.  Concerned about testicular injury.  Apparently had pushed him back between his legs and was concerned they have been twisted.  Exam is normal here.  No signs of torsion.  No swelling.  Pain is actually gone.  I see no signs of penile fracture or other injury.  No need for imaging.  Discussed this with him.  Will follow-up with primary.  Return for worsening symptoms    2:59 AM I met with the patient to gather history and to perform my initial exam. I discussed the plan for care while in the Emergency Department.       At the conclusion of the encounter I discussed the results of all of the tests and the disposition. The questions were answered. The patient or family acknowledged understanding and was agreeable with the care plan.     Medical Decision Making  Obtained supplemental history:Supplemental history obtained?: No  Reviewed external records: External records reviewed?: Documented in chart  Care impacted by chronic illness:N/A  Care significantly affected by social determinants of health:Access to Medical Care  Did you consider but not order tests?: Work up considered but not performed and documented in chart, if applicable  Did you interpret images independently?: Independent interpretation of ECG and images noted in documentation, when applicable.  Consultation discussion with other provider:Did you involve another provider (consultant, , pharmacy,  etc.)?: No  Discharge. No recommendations on prescription strength medication(s). See documentation for any additional details.  No MIPS measures identified.           MEDICATIONS GIVEN IN THE EMERGENCY:  Medications - No data to display    NEW PRESCRIPTIONS STARTED AT TODAY'S ER VISIT  Discharge Medication List as of 8/21/2024  3:12 AM             =================================================================    HPI    Patient information was obtained from: The patient    Use of : N/A         Saulo Cleary is a 24 year old male with no pertinent history who presents to this ED for evaluation of testicular pain.    The patient placed his testicles between his thigh and pulled on it by stretching/extending his legs. He notes this was an act of self-pleasure. He is denying of any pain at this time, but attributes this to possibly ingesting 5 mg THC as of recent. He is concerned of a penile fracture or testicular torsion. No complaints of penile bruising or any other associated symptoms at this time.        PAST MEDICAL HISTORY:  Past Medical History:   Diagnosis Date    ADHD (attention deficit hyperactivity disorder)     Allergic rhinitis, cause unspecified     Anxiety state, unspecified     Depression     Insomnia     Overweight(278.02)     Sialoadenitis     Unspecified asthma(493.90)        PAST SURGICAL HISTORY:  Past Surgical History:   Procedure Laterality Date    EXAM UNDER ANESTHESIA ANUS N/A 1/3/2023    Procedure: EXAM UNDER ANESTHESIA, ANUS, anal skin tag removal;  Surgeon: Jevon Velarde MD;  Location: [facility] OR    REMOVE TAG ANAL N/A 1/3/2023    Procedure: REMOVAL, SKIN TAG, ANUS;  Surgeon: Jevon Velarde MD;  Location: [facility] OR    wisdom teeth             CURRENT MEDICATIONS:    No current facility-administered medications for this encounter.     Current Outpatient Medications   Medication Sig Dispense Refill    amphetamine-dextroamphetamine (ADDERALL XR) 10 MG 24 hr capsule Take 10 mg by  mouth every morning      AUVELITY  MG TBCR Take 1 tablet by mouth 2 times daily           ALLERGIES:  No Known Allergies    FAMILY HISTORY:  Family History   Adopted: Yes   Problem Relation Age of Onset    Unknown/Adopted Mother     Unknown/Adopted Father     Anesthesia Reaction No family hx of     Thrombosis No family hx of        SOCIAL HISTORY:   Social History     Socioeconomic History    Marital status: Single    Number of children: 0   Occupational History    Occupation: student   Tobacco Use    Smoking status: Never     Passive exposure: Never    Smokeless tobacco: Never   Vaping Use    Vaping status: Never Used   Substance and Sexual Activity    Alcohol use: Never    Drug use: Never    Sexual activity: Never   Social History Narrative    Parents are .  Ike and sister Monet stay at both parents' homes.     Social Determinants of Health     Financial Resource Strain: Low Risk  (10/5/2023)    Financial Resource Strain     Within the past 12 months, have you or your family members you live with been unable to get utilities (heat, electricity) when it was really needed?: No   Food Insecurity: Low Risk  (10/5/2023)    Food Insecurity     Within the past 12 months, did you worry that your food would run out before you got money to buy more?: No     Within the past 12 months, did the food you bought just not last and you didn t have money to get more?: No   Transportation Needs: Low Risk  (10/5/2023)    Transportation Needs     Within the past 12 months, has lack of transportation kept you from medical appointments, getting your medicines, non-medical meetings or appointments, work, or from getting things that you need?: No   Interpersonal Safety: Low Risk  (6/11/2024)    Interpersonal Safety     Do you feel physically and emotionally safe where you currently live?: Yes     Within the past 12 months, have you been hit, slapped, kicked or otherwise physically hurt by someone?: No     Within the past  "12 months, have you been humiliated or emotionally abused in other ways by your partner or ex-partner?: No   Housing Stability: Low Risk  (10/5/2023)    Housing Stability     Do you have housing? : Yes     Are you worried about losing your housing?: No       VITALS:  /75   Pulse 99   Temp 97.7  F (36.5  C) (Temporal)   Resp 18   Ht 1.575 m (5' 2\")   Wt 60.8 kg (134 lb)   SpO2 98%   BMI 24.51 kg/m      PHYSICAL EXAM    Physical Exam  Vitals and nursing note reviewed.   Constitutional:       General: He is not in acute distress.     Appearance: He is not diaphoretic.   HENT:      Head: Atraumatic.   Eyes:      General: No scleral icterus.     Pupils: Pupils are equal, round, and reactive to light.   Cardiovascular:      Rate and Rhythm: Normal rate and regular rhythm.      Heart sounds: Normal heart sounds.   Pulmonary:      Effort: No respiratory distress.      Breath sounds: Normal breath sounds.   Abdominal:      Palpations: Abdomen is soft.      Tenderness: There is no abdominal tenderness.   Genitourinary:     Penis: Normal and circumcised.       Testes: Normal. Cremasteric reflex is present.         Right: Mass, tenderness or swelling not present. Cremasteric reflex is present.          Left: Mass, tenderness or swelling not present. Cremasteric reflex is present.    Musculoskeletal:         General: No tenderness.   Lymphadenopathy:      Cervical: No cervical adenopathy.   Skin:     General: Skin is warm.      Findings: No rash.           LAB:  All pertinent labs reviewed and interpreted.  Labs Ordered and Resulted from Time of ED Arrival to Time of ED Departure - No data to display    RADIOLOGY:  Reviewed all pertinent imaging. Please see official radiology report.  No orders to display         Robert COREAS am serving as a scribe to document services personally performed by Dr. Elliott Johnson, based on my observation and the provider's statements to me. Elliott COREAS MD attest that Robert" Polina is acting in a scribe capacity, has observed my performance of the services and has documented them in accordance with my direction.    Elliott Johnson M.D.  Emergency Medicine  [facility] EMERGENCY ROOM  [address] [facility] [id number] [phone]  Dept: [phone]       Elliott Johnson MD  08/21/24 0506

## 2024-12-01 ENCOUNTER — HEALTH MAINTENANCE LETTER (OUTPATIENT)
Age: 24
End: 2024-12-01

## (undated) DEVICE — RX BACITRACIN OINTMENT 14G 0.5OZ 45802-060-01

## (undated) DEVICE — ESU ELEC NDL 1" COATED/INSULATED E1465

## (undated) DEVICE — LINEN TOWEL PACK X5 5464

## (undated) DEVICE — PACK RECTAL KIT CUSTOM ASC

## (undated) DEVICE — SU VICRYL 3-0 SH 27" UND J416H

## (undated) DEVICE — PANTIES MESH LG/XLG 2PK 706M2

## (undated) DEVICE — TAPE DURAPORE 3" SILK 1538-3

## (undated) DEVICE — GLOVE PROTEXIS W/NEU-THERA 7.0  2D73TE70

## (undated) DEVICE — PAD CHUX UNDERPAD 30X30"

## (undated) DEVICE — ESU GROUND PAD ADULT W/CORD E7507

## (undated) DEVICE — PREP POVIDONE-IODINE 7.5% SCRUB 4OZ BOTTLE MDS093945

## (undated) DEVICE — SOL WATER IRRIG 500ML BOTTLE 2F7113

## (undated) DEVICE — SU MONOCRYL 4-0 PS-2 18" UND Y496G

## (undated) DEVICE — SUCTION MANIFOLD NEPTUNE 2 SYS 1 PORT 702-025-000

## (undated) DEVICE — PREP SKIN SCRUB TRAY 4461A

## (undated) RX ORDER — ACETAMINOPHEN 325 MG/1
TABLET ORAL
Status: DISPENSED
Start: 2023-01-03

## (undated) RX ORDER — METRONIDAZOLE 500 MG/100ML
INJECTION, SOLUTION INTRAVENOUS
Status: DISPENSED
Start: 2023-01-03